# Patient Record
Sex: FEMALE | Race: WHITE
[De-identification: names, ages, dates, MRNs, and addresses within clinical notes are randomized per-mention and may not be internally consistent; named-entity substitution may affect disease eponyms.]

---

## 2018-03-17 ENCOUNTER — HOSPITAL ENCOUNTER (EMERGENCY)
Dept: HOSPITAL 91 - FTE | Age: 48
Discharge: HOME | End: 2018-03-17
Payer: COMMERCIAL

## 2018-03-17 ENCOUNTER — HOSPITAL ENCOUNTER (EMERGENCY)
Age: 48
Discharge: HOME | End: 2018-03-17

## 2018-03-17 DIAGNOSIS — E11.9: ICD-10-CM

## 2018-03-17 DIAGNOSIS — N39.0: Primary | ICD-10-CM

## 2018-03-17 DIAGNOSIS — Z79.84: ICD-10-CM

## 2018-03-17 LAB
ADD MAN DIFF?: NO
ADD UMIC: YES
ALANINE AMINOTRANSFERASE: 61 IU/L (ref 13–69)
ALBUMIN/GLOBULIN RATIO: 1.2
ALBUMIN: 4.7 G/DL (ref 3.3–4.9)
ALKALINE PHOSPHATASE: 110 IU/L (ref 42–121)
AMYLASE: 81 U/L (ref 11–123)
ANION GAP: 17 (ref 8–16)
ASPARTATE AMINO TRANSFERASE: 30 IU/L (ref 15–46)
BASOPHIL #: 0 10^3/UL (ref 0–0.1)
BASOPHILS %: 0.2 % (ref 0–2)
BILIRUBIN,DIRECT: 0 MG/DL (ref 0–0.2)
BILIRUBIN,TOTAL: 0.6 MG/DL (ref 0.2–1.3)
BLOOD UREA NITROGEN: 7 MG/DL (ref 7–20)
CALCIUM: 9.5 MG/DL (ref 8.4–10.2)
CARBON DIOXIDE: 27 MMOL/L (ref 21–31)
CHLORIDE: 101 MMOL/L (ref 97–110)
CREATININE: 0.49 MG/DL (ref 0.44–1)
EOSINOPHILS #: 0.1 10^3/UL (ref 0–0.5)
EOSINOPHILS %: 0.4 % (ref 0–7)
GLOBULIN: 3.9 G/DL (ref 1.3–3.2)
GLUCOSE: 216 MG/DL (ref 70–220)
HEMATOCRIT: 40.7 % (ref 37–47)
HEMOGLOBIN: 14.1 G/DL (ref 12–16)
INR: 0.91
LIPASE: 42 U/L (ref 23–300)
LYMPHOCYTES #: 1.3 10^3/UL (ref 0.8–2.9)
LYMPHOCYTES %: 10.1 % (ref 15–51)
MEAN CORPUSCULAR HEMOGLOBIN: 33.2 PG (ref 29–33)
MEAN CORPUSCULAR HGB CONC: 34.6 G/DL (ref 32–37)
MEAN CORPUSCULAR VOLUME: 95.8 FL (ref 82–101)
MEAN PLATELET VOLUME: 10.7 FL (ref 7.4–10.4)
MONOCYTE #: 0.2 10^3/UL (ref 0.3–0.9)
MONOCYTES %: 1.7 % (ref 0–11)
NEUTROPHIL #: 11 10^3/UL (ref 1.6–7.5)
NEUTROPHILS %: 87.1 % (ref 39–77)
NUCLEATED RED BLOOD CELLS #: 0 10^3/UL (ref 0–0)
NUCLEATED RED BLOOD CELLS%: 0 /100WBC (ref 0–0)
PARTIAL THROMBOPLASTIN TIME: 31.9 SEC (ref 25–35)
PLATELET COUNT: 277 10^3/UL (ref 140–415)
POTASSIUM: 3.8 MMOL/L (ref 3.5–5.1)
PROTIME: 12.3 SEC (ref 11.9–14.9)
PT RATIO: 1
RED BLOOD COUNT: 4.25 10^6/UL (ref 4.2–5.4)
RED CELL DISTRIBUTION WIDTH: 11.7 % (ref 11.5–14.5)
SODIUM: 141 MMOL/L (ref 135–144)
TOTAL PROTEIN: 8.6 G/DL (ref 6.1–8.1)
TROPONIN-I: < 0.012 NG/ML (ref 0–0.12)
UR ASCORBIC ACID: 40 MG/DL
UR BACTERIA: (no result) /HPF
UR BILIRUBIN (DIP): NEGATIVE MG/DL
UR BLOOD (DIP): NEGATIVE MG/DL
UR CLARITY: (no result)
UR COLOR: YELLOW
UR GLUCOSE (DIP): (no result) MG/DL
UR KETONES (DIP): (no result) MG/DL
UR LEUKOCYTE ESTERASE (DIP): (no result) LEU/UL
UR MUCUS: (no result) /HPF
UR NITRITE (DIP): NEGATIVE MG/DL
UR PH (DIP): 6 (ref 5–9)
UR RBC: 0 /HPF (ref 0–5)
UR SPECIFIC GRAVITY (DIP): 1.02 (ref 1–1.03)
UR SQUAMOUS EPITHELIAL CELL: (no result) /HPF
UR TOTAL PROTEIN (DIP): (no result) MG/DL
UR UROBILINOGEN (DIP): NEGATIVE MG/DL
UR WBC: 3 /HPF (ref 0–5)
WHITE BLOOD COUNT: 12.6 10^3/UL (ref 4.8–10.8)

## 2018-03-17 PROCEDURE — 85025 COMPLETE CBC W/AUTO DIFF WBC: CPT

## 2018-03-17 PROCEDURE — 84484 ASSAY OF TROPONIN QUANT: CPT

## 2018-03-17 PROCEDURE — 82150 ASSAY OF AMYLASE: CPT

## 2018-03-17 PROCEDURE — 80053 COMPREHEN METABOLIC PANEL: CPT

## 2018-03-17 PROCEDURE — 85610 PROTHROMBIN TIME: CPT

## 2018-03-17 PROCEDURE — 81001 URINALYSIS AUTO W/SCOPE: CPT

## 2018-03-17 PROCEDURE — 96374 THER/PROPH/DIAG INJ IV PUSH: CPT

## 2018-03-17 PROCEDURE — 96376 TX/PRO/DX INJ SAME DRUG ADON: CPT

## 2018-03-17 PROCEDURE — 76705 ECHO EXAM OF ABDOMEN: CPT

## 2018-03-17 PROCEDURE — 99285 EMERGENCY DEPT VISIT HI MDM: CPT

## 2018-03-17 PROCEDURE — 93005 ELECTROCARDIOGRAM TRACING: CPT

## 2018-03-17 PROCEDURE — 87086 URINE CULTURE/COLONY COUNT: CPT

## 2018-03-17 PROCEDURE — 85730 THROMBOPLASTIN TIME PARTIAL: CPT

## 2018-03-17 PROCEDURE — 83690 ASSAY OF LIPASE: CPT

## 2018-03-17 PROCEDURE — 36415 COLL VENOUS BLD VENIPUNCTURE: CPT

## 2018-03-17 PROCEDURE — 81025 URINE PREGNANCY TEST: CPT

## 2018-03-17 RX ADMIN — THIAMINE HYDROCHLORIDE 1 MLS/HR: 100 INJECTION, SOLUTION INTRAMUSCULAR; INTRAVENOUS at 15:26

## 2018-03-17 RX ADMIN — HYDROMORPHONE HYDROCHLORIDE 1 MG: 1 INJECTION, SOLUTION INTRAMUSCULAR; INTRAVENOUS; SUBCUTANEOUS at 13:52

## 2018-03-17 RX ADMIN — HYDROMORPHONE HYDROCHLORIDE 1 MG: 1 INJECTION, SOLUTION INTRAMUSCULAR; INTRAVENOUS; SUBCUTANEOUS at 15:26

## 2018-03-17 RX ADMIN — ONDANSETRON 1 MG: 4 TABLET, ORALLY DISINTEGRATING ORAL at 13:51

## 2018-09-02 ENCOUNTER — HOSPITAL ENCOUNTER (EMERGENCY)
Age: 48
Discharge: HOME | End: 2018-09-02

## 2018-09-02 ENCOUNTER — HOSPITAL ENCOUNTER (EMERGENCY)
Dept: HOSPITAL 91 - FTE | Age: 48
Discharge: HOME | End: 2018-09-02
Payer: COMMERCIAL

## 2018-09-02 DIAGNOSIS — Z79.84: ICD-10-CM

## 2018-09-02 DIAGNOSIS — E11.9: ICD-10-CM

## 2018-09-02 DIAGNOSIS — R10.13: Primary | ICD-10-CM

## 2018-09-02 DIAGNOSIS — R30.0: ICD-10-CM

## 2018-09-02 DIAGNOSIS — R11.0: ICD-10-CM

## 2018-09-02 LAB
ADD MAN DIFF?: NO
ADD UMIC: NO
ALANINE AMINOTRANSFERASE: 16 IU/L (ref 13–69)
ALBUMIN/GLOBULIN RATIO: 1.36
ALBUMIN: 4.5 G/DL (ref 3.3–4.9)
ALKALINE PHOSPHATASE: 62 IU/L (ref 42–121)
ANION GAP: 21 (ref 8–16)
ASPARTATE AMINO TRANSFERASE: 27 IU/L (ref 15–46)
BASOPHIL #: 0 10^3/UL (ref 0–0.1)
BASOPHILS %: 0.3 % (ref 0–2)
BILIRUBIN,DIRECT: 0 MG/DL (ref 0–0.2)
BILIRUBIN,TOTAL: 0.7 MG/DL (ref 0.2–1.3)
BLOOD UREA NITROGEN: 7 MG/DL (ref 7–20)
CALCIUM: 9.6 MG/DL (ref 8.4–10.2)
CARBON DIOXIDE: 19 MMOL/L (ref 21–31)
CHLORIDE: 103 MMOL/L (ref 97–110)
CREATININE: 0.48 MG/DL (ref 0.44–1)
EOSINOPHILS #: 0.1 10^3/UL (ref 0–0.5)
EOSINOPHILS %: 0.3 % (ref 0–7)
GLOBULIN: 3.3 G/DL (ref 1.3–3.2)
GLUCOSE: 248 MG/DL (ref 70–220)
HEMATOCRIT: 37.5 % (ref 37–47)
HEMOGLOBIN: 12.9 G/DL (ref 12–16)
IMMATURE GRANS #M: 0.05 10^3/UL (ref 0–0.03)
IMMATURE GRANS % (M): 0.3 % (ref 0–0.43)
LIPASE: 26 U/L (ref 23–300)
LYMPHOCYTES #: 1.2 10^3/UL (ref 0.8–2.9)
LYMPHOCYTES %: 8 % (ref 15–51)
MEAN CORPUSCULAR HEMOGLOBIN: 33.5 PG (ref 29–33)
MEAN CORPUSCULAR HGB CONC: 34.4 G/DL (ref 32–37)
MEAN CORPUSCULAR VOLUME: 97.4 FL (ref 82–101)
MEAN PLATELET VOLUME: 10.4 FL (ref 7.4–10.4)
MONOCYTE #: 0.6 10^3/UL (ref 0.3–0.9)
MONOCYTES %: 4.2 % (ref 0–11)
NEUTROPHIL #: 13.4 10^3/UL (ref 1.6–7.5)
NEUTROPHILS %: 86.9 % (ref 39–77)
NUCLEATED RED BLOOD CELLS #: 0 10^3/UL (ref 0–0)
NUCLEATED RED BLOOD CELLS%: 0 /100WBC (ref 0–0)
PLATELET COUNT: 280 10^3/UL (ref 140–415)
POTASSIUM: 4.2 MMOL/L (ref 3.5–5.1)
RED BLOOD COUNT: 3.85 10^6/UL (ref 4.2–5.4)
RED CELL DISTRIBUTION WIDTH: 11.9 % (ref 11.5–14.5)
SODIUM: 139 MMOL/L (ref 135–144)
TOTAL PROTEIN: 7.8 G/DL (ref 6.1–8.1)
TROPONIN-I: < 0.012 NG/ML (ref 0–0.12)
UR ASCORBIC ACID: 20 MG/DL
UR BILIRUBIN (DIP): NEGATIVE MG/DL
UR BLOOD (DIP): NEGATIVE MG/DL
UR CLARITY: (no result)
UR COLOR: YELLOW
UR GLUCOSE (DIP): (no result) MG/DL
UR KETONES (DIP): (no result) MG/DL
UR LEUKOCYTE ESTERASE (DIP): NEGATIVE LEU/UL
UR MUCUS: (no result) /HPF
UR NITRITE (DIP): NEGATIVE MG/DL
UR PH (DIP): 5 (ref 5–9)
UR RBC: 1 /HPF (ref 0–5)
UR SPECIFIC GRAVITY (DIP): 1.03 (ref 1–1.03)
UR SQUAMOUS EPITHELIAL CELL: (no result) /HPF
UR TOTAL PROTEIN (DIP): NEGATIVE MG/DL
UR UROBILINOGEN (DIP): NEGATIVE MG/DL
UR WBC: 2 /HPF (ref 0–5)
WHITE BLOOD COUNT: 15.4 10^3/UL (ref 4.8–10.8)

## 2018-09-02 PROCEDURE — 87086 URINE CULTURE/COLONY COUNT: CPT

## 2018-09-02 PROCEDURE — 85025 COMPLETE CBC W/AUTO DIFF WBC: CPT

## 2018-09-02 PROCEDURE — 82962 GLUCOSE BLOOD TEST: CPT

## 2018-09-02 PROCEDURE — 81003 URINALYSIS AUTO W/O SCOPE: CPT

## 2018-09-02 PROCEDURE — 99284 EMERGENCY DEPT VISIT MOD MDM: CPT

## 2018-09-02 PROCEDURE — 80053 COMPREHEN METABOLIC PANEL: CPT

## 2018-09-02 PROCEDURE — 84484 ASSAY OF TROPONIN QUANT: CPT

## 2018-09-02 PROCEDURE — 83690 ASSAY OF LIPASE: CPT

## 2018-09-02 PROCEDURE — 81001 URINALYSIS AUTO W/SCOPE: CPT

## 2018-09-02 RX ADMIN — THIAMINE HYDROCHLORIDE 1 MLS/HR: 100 INJECTION, SOLUTION INTRAMUSCULAR; INTRAVENOUS at 10:48

## 2018-09-02 RX ADMIN — FAMOTIDINE 1 MG: 20 TABLET ORAL at 10:29

## 2018-09-02 RX ADMIN — ALUMINUM HYDROXIDE, MAGNESIUM HYDROXIDE, DIMETHICONE 1 ML: 200; 200; 20 SUSPENSION ORAL at 10:29

## 2018-09-02 RX ADMIN — ONDANSETRON 1 MG: 4 TABLET, ORALLY DISINTEGRATING ORAL at 10:29

## 2019-06-19 ENCOUNTER — HOSPITAL ENCOUNTER (INPATIENT)
Dept: HOSPITAL 91 - MS1 | Age: 49
LOS: 2 days | Discharge: HOME | DRG: 342 | End: 2019-06-21
Payer: COMMERCIAL

## 2019-06-19 ENCOUNTER — HOSPITAL ENCOUNTER (INPATIENT)
Dept: HOSPITAL 10 - E/R | Age: 49
LOS: 2 days | Discharge: HOME | DRG: 342 | End: 2019-06-21
Attending: INTERNAL MEDICINE
Payer: COMMERCIAL

## 2019-06-19 VITALS
WEIGHT: 132.94 LBS | WEIGHT: 132.94 LBS | BODY MASS INDEX: 23.55 KG/M2 | BODY MASS INDEX: 23.55 KG/M2 | HEIGHT: 63 IN | HEIGHT: 63 IN

## 2019-06-19 DIAGNOSIS — Z98.51: ICD-10-CM

## 2019-06-19 DIAGNOSIS — K35.80: Primary | ICD-10-CM

## 2019-06-19 DIAGNOSIS — E87.2: ICD-10-CM

## 2019-06-19 DIAGNOSIS — H40.9: ICD-10-CM

## 2019-06-19 DIAGNOSIS — E11.9: ICD-10-CM

## 2019-06-19 DIAGNOSIS — Z79.4: ICD-10-CM

## 2019-06-19 LAB
ADD MAN DIFF?: NO
ALANINE AMINOTRANSFERASE: 75 IU/L (ref 13–69)
ALBUMIN/GLOBULIN RATIO: 1.25
ALBUMIN: 5 G/DL (ref 3.3–4.9)
ALKALINE PHOSPHATASE: 92 IU/L (ref 42–121)
ANION GAP: 13 (ref 5–13)
ASPARTATE AMINO TRANSFERASE: 61 IU/L (ref 15–46)
BASOPHIL #: 0.1 10^3/UL (ref 0–0.1)
BASOPHILS %: 0.3 % (ref 0–2)
BILIRUBIN,DIRECT: 0 MG/DL (ref 0–0.2)
BILIRUBIN,TOTAL: 0.7 MG/DL (ref 0.2–1.3)
BLOOD UREA NITROGEN: 8 MG/DL (ref 7–20)
CALCIUM: 9.8 MG/DL (ref 8.4–10.2)
CARBON DIOXIDE: 26 MMOL/L (ref 21–31)
CHLORIDE: 98 MMOL/L (ref 97–110)
CREATININE: 0.46 MG/DL (ref 0.44–1)
EOSINOPHILS #: 0 10^3/UL (ref 0–0.5)
EOSINOPHILS %: 0.1 % (ref 0–7)
GLOBULIN: 4 G/DL (ref 1.3–3.2)
GLUCOSE: 228 MG/DL (ref 70–220)
HEMATOCRIT: 41.5 % (ref 37–47)
HEMOGLOBIN: 13.8 G/DL (ref 12–16)
IMMATURE GRANS #M: 0.06 10^3/UL (ref 0–0.03)
IMMATURE GRANS % (M): 0.4 % (ref 0–0.43)
LIPASE: 45 U/L (ref 23–300)
LYMPHOCYTES #: 1 10^3/UL (ref 0.8–2.9)
LYMPHOCYTES %: 6.4 % (ref 15–51)
MEAN CORPUSCULAR HEMOGLOBIN: 33 PG (ref 29–33)
MEAN CORPUSCULAR HGB CONC: 33.3 G/DL (ref 32–37)
MEAN CORPUSCULAR VOLUME: 99.3 FL (ref 82–101)
MEAN PLATELET VOLUME: 10 FL (ref 7.4–10.4)
MONOCYTE #: 0.2 10^3/UL (ref 0.3–0.9)
MONOCYTES %: 1.6 % (ref 0–11)
NEUTROPHIL #: 13.7 10^3/UL (ref 1.6–7.5)
NEUTROPHILS %: 91.2 % (ref 39–77)
NUCLEATED RED BLOOD CELLS #: 0 10^3/UL (ref 0–0)
NUCLEATED RED BLOOD CELLS%: 0 /100WBC (ref 0–0)
PLATELET COUNT: 254 10^3/UL (ref 140–415)
POTASSIUM: 3.8 MMOL/L (ref 3.5–5.1)
RED BLOOD COUNT: 4.18 10^6/UL (ref 4.2–5.4)
RED CELL DISTRIBUTION WIDTH: 11.9 % (ref 11.5–14.5)
SODIUM: 137 MMOL/L (ref 135–144)
TOTAL PROTEIN: 9 G/DL (ref 6.1–8.1)
URINE KETONES (DIP) POC: (no result)
URINE PH (DIP) POC: 7 (ref 5–8.5)
URINE TOTAL PROTEIN POC: (no result)
WHITE BLOOD COUNT: 15 10^3/UL (ref 4.8–10.8)

## 2019-06-19 PROCEDURE — 83735 ASSAY OF MAGNESIUM: CPT

## 2019-06-19 PROCEDURE — 82962 GLUCOSE BLOOD TEST: CPT

## 2019-06-19 PROCEDURE — 96375 TX/PRO/DX INJ NEW DRUG ADDON: CPT

## 2019-06-19 PROCEDURE — 80053 COMPREHEN METABOLIC PANEL: CPT

## 2019-06-19 PROCEDURE — 81025 URINE PREGNANCY TEST: CPT

## 2019-06-19 PROCEDURE — 80061 LIPID PANEL: CPT

## 2019-06-19 PROCEDURE — 99285 EMERGENCY DEPT VISIT HI MDM: CPT

## 2019-06-19 PROCEDURE — 85610 PROTHROMBIN TIME: CPT

## 2019-06-19 PROCEDURE — 93005 ELECTROCARDIOGRAM TRACING: CPT

## 2019-06-19 PROCEDURE — 88304 TISSUE EXAM BY PATHOLOGIST: CPT

## 2019-06-19 PROCEDURE — 74176 CT ABD & PELVIS W/O CONTRAST: CPT

## 2019-06-19 PROCEDURE — 71045 X-RAY EXAM CHEST 1 VIEW: CPT

## 2019-06-19 PROCEDURE — 85730 THROMBOPLASTIN TIME PARTIAL: CPT

## 2019-06-19 PROCEDURE — 85025 COMPLETE CBC W/AUTO DIFF WBC: CPT

## 2019-06-19 PROCEDURE — 84443 ASSAY THYROID STIM HORMONE: CPT

## 2019-06-19 PROCEDURE — 36415 COLL VENOUS BLD VENIPUNCTURE: CPT

## 2019-06-19 PROCEDURE — 96374 THER/PROPH/DIAG INJ IV PUSH: CPT

## 2019-06-19 PROCEDURE — 83690 ASSAY OF LIPASE: CPT

## 2019-06-19 PROCEDURE — 81003 URINALYSIS AUTO W/O SCOPE: CPT

## 2019-06-19 PROCEDURE — 83036 HEMOGLOBIN GLYCOSYLATED A1C: CPT

## 2019-06-19 RX ADMIN — KETOROLAC TROMETHAMINE 1 MG: 15 INJECTION, SOLUTION INTRAMUSCULAR; INTRAVENOUS at 22:58

## 2019-06-19 RX ADMIN — FAMOTIDINE 1 MG: 10 INJECTION, SOLUTION INTRAVENOUS at 22:39

## 2019-06-19 RX ADMIN — ALUMINUM HYDROXIDE, MAGNESIUM HYDROXIDE, DIMETHICONE 1 ML: 200; 200; 20 SUSPENSION ORAL at 22:39

## 2019-06-19 NOTE — ERD
ER Documentation


Chief Complaint


Chief Complaint





C/O EPIGASTRIC PAIN X5 HRS





HPI


The patient is a 49-year-old female, presenting to the ER because of recurrent 


epigastric abdominal pain about 4:30 PM, had similar symptoms previously, 


complains of constipation, denies fever, chills, neck pain, chest pain, dyspnea,


burping, vomiting, dysuria, complains of constipation.  She does not smoke nor 


drink





She had an ultrasound of the gallbladder September 2, 2018 that was unremarkable





Past medical history: Diabetes mellitus, glaucoma


Past surgical history: Tubal ligation, fibroid





ROS


All systems reviewed and are negative except as per history of present illness.





Medications


Home Meds


Active Scripts


Ondansetron (Ondansetron Odt) 4 Mg Tab.rapdis, 4 MG PO Q6H PRN for NAUSEA AND/OR


VOMITING, #10 TAB


   Prov:SHANIQUE HERNADEZ PA-C         9/2/18


Famotidine* (Pepcid*) 20 Mg Tablet, 20 MG PO QHS for 10 Days, TAB


   Prov:SHANIQUE HERNADEZ PA-C         9/2/18


Omeprazole* (Omeprazole*) 20 Mg Capsule.dr, 20 MG PO DAILY, #14


   Prov:SHANIQUE HERNADEZ PA-C         9/2/18


Metformin Hcl* (Metformin Hcl* ER) 1,000 Mg Tab.er.24, 1000 MG PO DAILY, #30 TAB


   Prov:SHANIQUE HERNADEZ PA-C         9/2/18


Metronidazole* (Metrogel* Vaginal) 0.75% -70 Gram Gel.w.appl, 1 APPFUL VAG HS 


for 7 Days, TUB


   Prov:SHANIQUE HERNADEZ PA-C         9/2/18


Hydrocodone/Acetaminophen (Norco 5-325 Tablet) 1 Each Tablet, 1 TAB PO Q6H PRN 


for PAIN, #20 TAB


   Prov:PASILASUNITA STAFFORDAR F         3/17/18


Acetaminophen* (Tylophen*) 500 Mg Capsule, 1 CAP PO Q4 PRN for PAIN AND OR 


ELEVATED TEMP, #20 CAP


   Prov:PASILABANSUNITAAR F         3/17/18


Cephalexin* (Keflex*) 500 Mg Capsule, 500 MG PO QID for 5 Days, CAP


   Prov:PASILABAN,KLAR F         3/17/18


Famotidine* (Pepcid*) 20 Mg Tablet, 20 MG PO DAILY for 30 Days, TAB


   Prov:CADY ESTRELLA         3/17/18


Ondansetron Hcl* (Zofran*) 4 Mg Tablet, 4 MG PO Q8H PRN for NAUSEA AND/OR 


VOMITING, #30 TAB


   Prov:CADY ESTRELLA         3/17/18


Metformin* (Glucophage*) 500 Mg Tab, 500 MG PO BID, #60 TAB


   Prov:CADY ESTRELLA         3/17/18





Allergies


Allergies:  


Coded Allergies:  


     No Known Allergy (Unverified , 3/17/18)





PMhx/Soc


History of Surgery:  Yes (Fibroids removal,Tubal ligation)


Anesthesia Reaction:  No


Hx Miscellaneous Medical Probl:  Yes (glaucoma,DM2)


Hx Alcohol Use:  No


Hx Substance Use:  No


Hx Tobacco Use:  No





Physical Exam


Vitals





Vital Signs


  Date      Temp  Pulse  Resp  B/P (MAP)   Pulse Ox  O2          O2 Flow    FiO2


Time                                                 Delivery    Rate


   6/19/19  98.4     79    19      152/71       100


     22:17                           (98)





Physical Exam


 Const:      No acute distress.


 Head:        Atraumatic.


 Eyes:       Normal Conjunctiva.


 ENT:         Normal External Ears, Nose and Mouth.


 Neck:        Full range of motion.  No meningismus.


 Resp:         Clear to auscultation bilaterally.


 Cardio:       Regular rate and rhythm.


 Abd:         Soft,  non distended, normal bowel sounds, epigastric and 


suprapubic abdominal tenderness, no right lower quadrant/right upper 


quadrant/rigidity/rebound/CVA tenderness


 Skin:         No petechiae or rashes.


 Back:        No midline or flank tenderness.


 Ext:          No cyanosis, or edema.


 Neur:        Awake and alert. No focal deficit


 Psych:        Normal Mood and Affect.


Result Diagram:  


6/19/19 2236 6/19/19 2236





Results 24 hrs





Laboratory Tests


Test
                               6/19/19
22:31  6/19/19
22:33   6/19/19
22:36


POC Beta HCG, Qualitative           NEGATIVE


Bedside Urine pH (LAB)                                      7.0


Bedside Urine Protein (LAB)                                  2+


Bedside Urine Glucose (UA)                                0.50%


Bedside Urine Ketones (LAB)                                  3+


Bedside Urine Blood                                Negative


Bedside Urine Nitrite (LAB)                        Negative


Bedside Urine Leukocyte
Esterase    
              Negative 
     



(L


White Blood Count                                                  15.0 10^3/ul


Red Blood Count                                                    4.18 10^6/ul


Hemoglobin                                                            13.8 g/dl


Hematocrit                                                               41.5 %


Mean Corpuscular Volume                                                 99.3 fl


Mean Corpuscular Hemoglobin                                             33.0 pg


Mean Corpuscular                    
              
                 33.3 g/dl 



Hemoglobin
Concent


Red Cell Distribution Width                                              11.9 %


Platelet Count                                                      254 10^3/UL


Mean Platelet Volume                                                    10.0 fl


Immature Granulocytes %                                                 0.400 %


Neutrophils %                                                            91.2 %


Lymphocytes %                                                             6.4 %


Monocytes %                                                               1.6 %


Eosinophils %                                                             0.1 %


Basophils %                                                               0.3 %


Nucleated Red Blood Cells %                                         0.0 /100WBC


Immature Granulocytes #                                           0.060 10^3/ul


Neutrophils #                                                      13.7 10^3/ul


Lymphocytes #                                                       1.0 10^3/ul


Monocytes #                                                         0.2 10^3/ul


Eosinophils #                                                       0.0 10^3/ul


Basophils #                                                         0.1 10^3/ul


Nucleated Red Blood Cells #                                         0.0 10^3/ul


Sodium Level                                                         137 mmol/L


Potassium Level                                                      3.8 mmol/L


Chloride Level                                                        98 mmol/L


Carbon Dioxide Level                                                  26 mmol/L


Anion Gap                                                                    13


Blood Urea Nitrogen                                                     8 mg/dl


Creatinine                                                           0.46 mg/dl


Est Glomerular Filtrat Rate
mL/min  
              
              > 60 mL/min 



Glucose Level                                                         228 mg/dl


Calcium Level                                                         9.8 mg/dl


Total Bilirubin                                                       0.7 mg/dl


Direct Bilirubin                                                     0.00 mg/dl


Indirect Bilirubin                                                    0.7 mg/dl


Aspartate Amino Transf
(AST/SGOT)   
              
                   61 IU/L 



Alanine                             
              
                   75 IU/L 



Aminotransferase
(ALT/SGPT)


Alkaline Phosphatase                                                    92 IU/L


Total Protein                                                          9.0 g/dl


Albumin                                                                5.0 g/dl


Globulin                                                              4.00 g/dl


Albumin/Globulin Ratio                                                     1.25


Lipase                                                                   45 U/L





Current Medications


 Medications
   Dose
          Sig/Ron
       Start Time
   Status  Last


 (Trade)       Ordered        Route
 PRN     Stop Time              Admin
Dose


                              Reason                                Admin


                40 ml          ONCE  ONCE
    6/19/19       DC           6/19/19


Miscellaneous                 PO
            23:00
                       22:39




 Medication
                                6/19/19 23:01


 (Gi Cocktail


(2))


 Famotidine
    20 mg          ONCE  ONCE
    6/19/19       DC           6/19/19


(Pepcid Iv)                   IV
            23:00
                       22:39



                                             6/19/19 23:01


 Ketorolac
     30 mg          ONCE  ONCE
    6/19/19       DC           6/19/19


Tromethamine
                 IV
            22:52
                       22:58



 (Toradol)                                   6/19/19 22:53








Procedures/MDM


MEDICAL MAKING DECISION: The patient is a 49-year-old female, presenting with 


acute abdominal pain unclear etiology with leukocytosis.  I advised her of 


options that she can have a CT scan of the abdomen now to rule out any pathology


or return in 8-hour for evaluation, she would like to have abdominal CT scan now





She was treated with GI cocktail, Pepcid IV, Toradol 30 g IV for pain with good 


response.





The differential diagnoses considered include but are not limited to 


cholelithiasis, cholecystitis,  choledocholithiasis, cholangitis, pancreatitis, 


hepatitis, gastritis, peptic ulcer disease, gastric ulcer, appendicitis,  cyst


itis,  diverticulitis, partial small bowel obstruction.





Departure


Diagnosis:  


   Primary Impression:  


   Abdominal pain


Condition:  Stable


Comments


If the abdominal CT scan is negative, she was advised to return in 8-hour for 


reevaluation





The on call physician Dr Elias will review the CT scan and treat her 


accordingly











MILAGRO HINDS MD              Jun 19, 2019 22:21

## 2019-06-20 VITALS — SYSTOLIC BLOOD PRESSURE: 128 MMHG | RESPIRATION RATE: 20 BRPM | DIASTOLIC BLOOD PRESSURE: 74 MMHG | HEART RATE: 68 BPM

## 2019-06-20 VITALS — SYSTOLIC BLOOD PRESSURE: 125 MMHG | DIASTOLIC BLOOD PRESSURE: 76 MMHG | HEART RATE: 68 BPM | RESPIRATION RATE: 14 BRPM

## 2019-06-20 VITALS — HEART RATE: 68 BPM | DIASTOLIC BLOOD PRESSURE: 71 MMHG | RESPIRATION RATE: 15 BRPM | SYSTOLIC BLOOD PRESSURE: 120 MMHG

## 2019-06-20 VITALS — SYSTOLIC BLOOD PRESSURE: 143 MMHG | HEART RATE: 68 BPM | DIASTOLIC BLOOD PRESSURE: 78 MMHG | RESPIRATION RATE: 20 BRPM

## 2019-06-20 VITALS — SYSTOLIC BLOOD PRESSURE: 138 MMHG | RESPIRATION RATE: 17 BRPM | HEART RATE: 68 BPM | DIASTOLIC BLOOD PRESSURE: 75 MMHG

## 2019-06-20 VITALS — HEART RATE: 70 BPM | DIASTOLIC BLOOD PRESSURE: 67 MMHG | SYSTOLIC BLOOD PRESSURE: 126 MMHG

## 2019-06-20 VITALS — DIASTOLIC BLOOD PRESSURE: 69 MMHG | SYSTOLIC BLOOD PRESSURE: 127 MMHG | RESPIRATION RATE: 19 BRPM | HEART RATE: 70 BPM

## 2019-06-20 VITALS — DIASTOLIC BLOOD PRESSURE: 67 MMHG | SYSTOLIC BLOOD PRESSURE: 119 MMHG | HEART RATE: 68 BPM | RESPIRATION RATE: 16 BRPM

## 2019-06-20 VITALS — SYSTOLIC BLOOD PRESSURE: 132 MMHG | DIASTOLIC BLOOD PRESSURE: 70 MMHG | HEART RATE: 65 BPM

## 2019-06-20 VITALS — DIASTOLIC BLOOD PRESSURE: 62 MMHG | SYSTOLIC BLOOD PRESSURE: 98 MMHG | HEART RATE: 71 BPM | RESPIRATION RATE: 18 BRPM

## 2019-06-20 VITALS — HEART RATE: 75 BPM | SYSTOLIC BLOOD PRESSURE: 139 MMHG | DIASTOLIC BLOOD PRESSURE: 71 MMHG | RESPIRATION RATE: 16 BRPM

## 2019-06-20 VITALS — DIASTOLIC BLOOD PRESSURE: 67 MMHG | SYSTOLIC BLOOD PRESSURE: 123 MMHG | HEART RATE: 68 BPM

## 2019-06-20 VITALS — SYSTOLIC BLOOD PRESSURE: 123 MMHG | HEART RATE: 75 BPM | DIASTOLIC BLOOD PRESSURE: 71 MMHG

## 2019-06-20 VITALS — SYSTOLIC BLOOD PRESSURE: 113 MMHG | RESPIRATION RATE: 17 BRPM | DIASTOLIC BLOOD PRESSURE: 55 MMHG | HEART RATE: 96 BPM

## 2019-06-20 VITALS — HEART RATE: 66 BPM | RESPIRATION RATE: 15 BRPM | SYSTOLIC BLOOD PRESSURE: 136 MMHG | DIASTOLIC BLOOD PRESSURE: 74 MMHG

## 2019-06-20 LAB
ADD MAN DIFF?: NO
ALANINE AMINOTRANSFERASE: 61 IU/L (ref 13–69)
ALBUMIN/GLOBULIN RATIO: 1.38
ALBUMIN: 4.7 G/DL (ref 3.3–4.9)
ALKALINE PHOSPHATASE: 84 IU/L (ref 42–121)
ANION GAP: 15 (ref 5–13)
ASPARTATE AMINO TRANSFERASE: 54 IU/L (ref 15–46)
BASOPHIL #: 0.1 10^3/UL (ref 0–0.1)
BASOPHILS %: 0.3 % (ref 0–2)
BILIRUBIN,DIRECT: 0 MG/DL (ref 0–0.2)
BILIRUBIN,TOTAL: 1.1 MG/DL (ref 0.2–1.3)
BLOOD UREA NITROGEN: 6 MG/DL (ref 7–20)
CALCIUM: 10.1 MG/DL (ref 8.4–10.2)
CARBON DIOXIDE: 27 MMOL/L (ref 21–31)
CHLORIDE: 104 MMOL/L (ref 97–110)
CHOL/HDL RATIO: 3.2 RATIO
CHOLESTEROL: 219 MG/DL (ref 100–200)
CREATININE: 0.51 MG/DL (ref 0.44–1)
EOSINOPHILS #: 0 10^3/UL (ref 0–0.5)
EOSINOPHILS %: 0 % (ref 0–7)
GLOBULIN: 3.4 G/DL (ref 1.3–3.2)
GLUCOSE: 168 MG/DL (ref 70–220)
HDL CHOLESTEROL: 68 MG/DL (ref 37–92)
HEMATOCRIT: 41.2 % (ref 37–47)
HEMOGLOBIN A1C: 6.7 % (ref 0–5.9)
HEMOGLOBIN: 13.6 G/DL (ref 12–16)
IMMATURE GRANS #M: 0.13 10^3/UL (ref 0–0.03)
IMMATURE GRANS % (M): 0.7 % (ref 0–0.43)
INR: 0.86
LDL CHOLESTEROL,CALCULATED: 138 MG/DL
LYMPHOCYTES #: 1 10^3/UL (ref 0.8–2.9)
LYMPHOCYTES %: 5.6 % (ref 15–51)
MAGNESIUM: 2 MG/DL (ref 1.7–2.5)
MEAN CORPUSCULAR HEMOGLOBIN: 32.8 PG (ref 29–33)
MEAN CORPUSCULAR HGB CONC: 33 G/DL (ref 32–37)
MEAN CORPUSCULAR VOLUME: 99.3 FL (ref 82–101)
MEAN PLATELET VOLUME: 10.3 FL (ref 7.4–10.4)
MONOCYTE #: 1.2 10^3/UL (ref 0.3–0.9)
MONOCYTES %: 6.5 % (ref 0–11)
NEUTROPHIL #: 16.1 10^3/UL (ref 1.6–7.5)
NEUTROPHILS %: 86.9 % (ref 39–77)
NUCLEATED RED BLOOD CELLS #: 0 10^3/UL (ref 0–0)
NUCLEATED RED BLOOD CELLS%: 0 /100WBC (ref 0–0)
PARTIAL THROMBOPLASTIN TIME: 30.6 SEC (ref 23–35)
PLATELET COUNT: 260 10^3/UL (ref 140–415)
POTASSIUM: 3.9 MMOL/L (ref 3.5–5.1)
PROTIME: 11.8 SEC (ref 11.9–14.9)
PT RATIO: 0.9
RED BLOOD COUNT: 4.15 10^6/UL (ref 4.2–5.4)
RED CELL DISTRIBUTION WIDTH: 11.9 % (ref 11.5–14.5)
SODIUM: 146 MMOL/L (ref 135–144)
THYROID STIMULATING HORMONE: 0.27 MIU/L (ref 0.47–4.68)
TOTAL PROTEIN: 8.1 G/DL (ref 6.1–8.1)
TRIGLYCERIDES: 66 MG/DL (ref 0–149)
WHITE BLOOD COUNT: 18.5 10^3/UL (ref 4.8–10.8)

## 2019-06-20 PROCEDURE — 0DTJ4ZZ RESECTION OF APPENDIX, PERCUTANEOUS ENDOSCOPIC APPROACH: ICD-10-PCS | Performed by: SURGERY

## 2019-06-20 PROCEDURE — 0DTJ4ZZ RESECTION OF APPENDIX, PERCUTANEOUS ENDOSCOPIC APPROACH: ICD-10-PCS

## 2019-06-20 RX ADMIN — ASCORBIC ACID, VITAMIN A PALMITATE, CHOLECALCIFEROL, THIAMINE HYDROCHLORIDE, RIBOFLAVIN-5 PHOSPHATE SODIUM, PYRIDOXINE HYDROCHLORIDE, NIACINAMIDE, DEXPANTHENOL, ALPHA-TOCOPHEROL ACETATE, VITAMIN K1, FOLIC ACID, BIOTIN, CYANOCOBALAMIN 1 MLS/HR: 200; 3300; 200; 6; 3.6; 6; 40; 15; 10; 150; 600; 60; 5 INJECTION, SOLUTION INTRAVENOUS at 05:48

## 2019-06-20 RX ADMIN — PIPERACILLIN SODIUM AND TAZOBACTAM SODIUM 1 MLS/HR: 3; .375 INJECTION, POWDER, LYOPHILIZED, FOR SOLUTION INTRAVENOUS at 12:00

## 2019-06-20 RX ADMIN — DORZOLAMIDE HYDROCHLORIDE AND TIMOLOL MALEATE 1 DROP: 20; 5 SOLUTION/ DROPS OPHTHALMIC at 02:30

## 2019-06-20 RX ADMIN — INSULIN ASPART 1 UNIT: 100 INJECTION, SOLUTION INTRAVENOUS; SUBCUTANEOUS at 21:06

## 2019-06-20 RX ADMIN — INSULIN ASPART 1 UNIT: 100 INJECTION, SOLUTION INTRAVENOUS; SUBCUTANEOUS at 05:00

## 2019-06-20 RX ADMIN — BUPIVACAINE HYDROCHLORIDE AND EPINEPHRINE BITARTRATE 1 ML: 2.5; .005 INJECTION, SOLUTION EPIDURAL; INFILTRATION; INTRACAUDAL; PERINEURAL at 11:00

## 2019-06-20 RX ADMIN — KETOROLAC TROMETHAMINE PRN MG: 15 INJECTION, SOLUTION INTRAMUSCULAR; INTRAVENOUS at 06:06

## 2019-06-20 RX ADMIN — FOLIC ACID SCH MLS/HR: 5 INJECTION, SOLUTION INTRAMUSCULAR; INTRAVENOUS; SUBCUTANEOUS at 18:20

## 2019-06-20 RX ADMIN — LATANOPROST 1 DROP: 50 SOLUTION OPHTHALMIC at 09:00

## 2019-06-20 RX ADMIN — PIPERACILLIN SODIUM AND TAZOBACTAM SODIUM 1 MLS/HR: 3; .375 INJECTION, POWDER, LYOPHILIZED, FOR SOLUTION INTRAVENOUS at 05:54

## 2019-06-20 RX ADMIN — DORZOLAMIDE HYDROCHLORIDE AND TIMOLOL MALEATE 1 DROP: 20; 5 SOLUTION/ DROPS OPHTHALMIC at 09:00

## 2019-06-20 RX ADMIN — FAMOTIDINE 1 MG: 20 TABLET ORAL at 21:02

## 2019-06-20 RX ADMIN — FOLIC ACID SCH MLS/HR: 5 INJECTION, SOLUTION INTRAMUSCULAR; INTRAVENOUS; SUBCUTANEOUS at 05:48

## 2019-06-20 RX ADMIN — PIPERACILLIN SODIUM AND TAZOBACTAM SODIUM SCH MLS/HR: 3; .375 INJECTION, POWDER, LYOPHILIZED, FOR SOLUTION INTRAVENOUS at 17:44

## 2019-06-20 RX ADMIN — PANTOPRAZOLE SODIUM SCH MG: 40 TABLET, DELAYED RELEASE ORAL at 06:00

## 2019-06-20 RX ADMIN — INSULIN ASPART 1 UNIT: 100 INJECTION, SOLUTION INTRAVENOUS; SUBCUTANEOUS at 17:53

## 2019-06-20 RX ADMIN — PIPERACILLIN SODIUM AND TAZOBACTAM SODIUM SCH MLS/HR: 3; .375 INJECTION, POWDER, LYOPHILIZED, FOR SOLUTION INTRAVENOUS at 05:54

## 2019-06-20 RX ADMIN — INSULIN ASPART 1 UNIT: 100 INJECTION, SOLUTION INTRAVENOUS; SUBCUTANEOUS at 13:20

## 2019-06-20 RX ADMIN — KETOROLAC TROMETHAMINE 1 MG: 15 INJECTION, SOLUTION INTRAMUSCULAR; INTRAVENOUS at 06:06

## 2019-06-20 RX ADMIN — PIPERACILLIN SODIUM AND TAZOBACTAM SODIUM 1 MLS/HR: 3; .375 INJECTION, POWDER, LYOPHILIZED, FOR SOLUTION INTRAVENOUS at 17:44

## 2019-06-20 RX ADMIN — LATANOPROST SCH DROP: 50 SOLUTION OPHTHALMIC at 09:00

## 2019-06-20 RX ADMIN — PIPERACILLIN SODIUM AND TAZOBACTAM SODIUM 1 MLS/HR: 3; .375 INJECTION, POWDER, LYOPHILIZED, FOR SOLUTION INTRAVENOUS at 01:47

## 2019-06-20 RX ADMIN — INSULIN ASPART 1 UNIT: 100 INJECTION, SOLUTION INTRAVENOUS; SUBCUTANEOUS at 09:00

## 2019-06-20 RX ADMIN — PANTOPRAZOLE SODIUM 1 MG: 40 TABLET, DELAYED RELEASE ORAL at 06:00

## 2019-06-20 RX ADMIN — PIPERACILLIN SODIUM AND TAZOBACTAM SODIUM SCH MLS/HR: 3; .375 INJECTION, POWDER, LYOPHILIZED, FOR SOLUTION INTRAVENOUS at 12:00

## 2019-06-20 RX ADMIN — ASCORBIC ACID, VITAMIN A PALMITATE, CHOLECALCIFEROL, THIAMINE HYDROCHLORIDE, RIBOFLAVIN-5 PHOSPHATE SODIUM, PYRIDOXINE HYDROCHLORIDE, NIACINAMIDE, DEXPANTHENOL, ALPHA-TOCOPHEROL ACETATE, VITAMIN K1, FOLIC ACID, BIOTIN, CYANOCOBALAMIN 1 MLS/HR: 200; 3300; 200; 6; 3.6; 6; 40; 15; 10; 150; 600; 60; 5 INJECTION, SOLUTION INTRAVENOUS at 18:20

## 2019-06-20 RX ADMIN — DORZOLAMIDE HYDROCHLORIDE AND TIMOLOL MALEATE 1 DROP: 20; 5 SOLUTION/ DROPS OPHTHALMIC at 21:00

## 2019-06-20 NOTE — PREAC
Date/Time of Note


Date/Time of Note


DATE: 6/20/19 


TIME: 10:08





Anesthesia Eval and Record


Evaluation


Time Pre-Procedure Interview


DATE: 6/20/19 


TIME: 10:08


Age


49


Sex


female


NPO:  8 hrs


Preoperative diagnosis


appendicitis


Planned procedure


lap appy





Past Medical History


Past Medical History:  Includes


Endo:  Diabetes





Surgery & Anesthesia Issues


No known issue





Meds


Anticoagulation:  No


Beta Blocker within 24 hr:  No


Reason Beta Blocker not given:  Pt. not on B-Blocker


Active Scripts


Famotidine* (Pepcid*) 20 Mg Tablet, 20 MG PO QHS for 10 Days, TAB


   Prov:SHANIQUE HERNADEZ PA-C         9/2/18


Omeprazole* (Omeprazole*) 20 Mg Capsule.dr, 20 MG PO DAILY, #14


   Prov:SHANIQUE HERNADEZ PA-C         9/2/18


Metformin Hcl* (Metformin Hcl* ER) 1,000 Mg Tab.er.24, 1000 MG PO DAILY, #30 TAB


   Prov:SHANIQUE HERNADEZ PA-C         9/2/18


Metronidazole* (Metrogel* Vaginal) 0.75% -70 Gram Gel.w.appl, 1 APPFUL VAG HS 


for 7 Days, TUB


   Prov:SHANIQUE HERNADEZ PA-C         9/2/18


Acetaminophen* (Tylophen*) 500 Mg Capsule, 1 CAP PO Q4 PRN for PAIN AND OR 


ELEVATED TEMP, #20 CAP


   Prov:CADY ESTRELLA         3/17/18


Ondansetron Hcl* (Zofran*) 4 Mg Tablet, 4 MG PO Q8H PRN for NAUSEA AND/OR 


VOMITING, #30 TAB


   Prov:CADY ESTRLELA         3/17/18


Reported Medications


Dorzolamide/Timolol* (Dorzolamide/Timolol*) 10 Ml Drops, 1 DROP BOTH EYES BID, 


#1 EA


   6/20/19


[Rhopressa] 0.02% DROPS No Conflict Check, 1 DROP OPHTHALMIC DAILY


   APPLY 1 DROP INTO BOTH EYES AT BEDTIME


   6/20/19


Latanoprost (Latanoprost) 2.5 Ml Drops, 1 DROP OPHTHALMIC DAILY for 30 Days


   6/20/19


Discontinued Reported Medications


Dorzolamide/Timolol* (Dorzolamide/Timolol*) 10 Ml Drops


   6/20/19


Discontinued Scripts


Ondansetron (Ondansetron Odt) 4 Mg Tab.rapdis, 4 MG PO Q6H PRN for NAUSEA AND/OR


 VOMITING, #10 TAB


   Prov:SHANIQUE HERNADEZ PA-C         9/2/18


Hydrocodone/Acetaminophen (Norco 5-325 Tablet) 1 Each Tablet, 1 TAB PO Q6H PRN 


for PAIN, #20 TAB


   Prov:PASILACADY STAFFORD F         3/17/18


Cephalexin* (Keflex*) 500 Mg Capsule, 500 MG PO QID for 5 Days, CAP


   Prov:PASILABAN,SUNITAAR F         3/17/18


Famotidine* (Pepcid*) 20 Mg Tablet, 20 MG PO DAILY for 30 Days, TAB


   Prov:PASILABANCADY F         3/17/18


Metformin* (Glucophage*) 500 Mg Tab, 500 MG PO BID, #60 TAB


   Prov:PASILABANSUNITAAR F         3/17/18





Current Medications


Ondansetron HCl (Zofran Inj) 4 mg BRIDGE ORDER PRN IV NAUSEA/VOMITING;  Start 


6/20/19 at 02:00;  Stop 6/21/19 at 01:59


Acetaminophen (Tylenol Tab) 650 mg ER BRIDGE PRN PO .MILD PAIN 1-3 OR TEMP;  


Start 6/20/19 at 02:00;  Stop 6/21/19 at 01:59


IV Flush (NS 3 ml) 3 ml PER PROTOCOL IV ;  Start 6/20/19 at 02:30


Ondansetron HCl (Zofran Inj) 4 mg Q6H  PRN IV NAUSEA/VOMITING;  Start 6/20/19 at


 02:30


Acetaminophen (Tylenol Tab) 650 mg Q6H  PRN PO .PAIN 1-3 OR TEMP;  Start 6/20/19


 at 02:30


Morphine Sulfate (morphine) 2 mg Q4H  PRN IV .SEVERE PAIN 7-10;  Start 6/20/19 


at 02:30


Docusate Sodium (Colace) 100 mg Q12H  PRN PO .CONSTIPATION;  Start 6/20/19 at 


02:30


Bisacodyl (Dulcolax) 5 mg DAILY  PRN PO .CONSTIPATION;  Start 6/20/19 at 02:30


Dorzolamide/ Timolol (Cosopt) 1 drop BID BOTH EYES ;  Start 6/20/19 at 02:30


Famotidine (Pepcid) 20 mg QHS PO ;  Start 6/20/19 at 21:00


Latanoprost (Xalatan) 1 drop DAILY BOTH EYES ;  Start 6/20/19 at 09:00


Miscellaneous Information 1 drop DAILY OPHTHALMIC ;  Start 6/20/19 at 09:00;  


Status UNV


Pantoprazole (Protonix Tab) 40 mg DAILY@06 PO ;  Start 6/20/19 at 06:00


Insulin Aspart (Novolog Insulin Pen) NOVOLOG *MILD* ALGORI... Q4 SC ;  Start 


6/20/19 at 05:00


Ketorolac Tromethamine (Toradol) 30 mg Q6H  PRN IV PAIN LEVEL 1-3 Last 


administered on 6/20/19at 06:06; Admin Dose 30 MG;  Start 6/20/19 at 04:30;  


Stop 6/22/19 at 04:29


Piperacillin Sod/ Tazobactam Sod 100 ml @  200 mls/hr Q6 IVPB  Last administered


 on 6/20/19at 05:54; Admin Dose 200 MLS/HR;  Start 6/20/19 at 06:00


Dextrose/Sodium Chloride 1,000 ml @  75 mls/hr [id number] IV  Last administered on 


6/20/19at 05:48; Admin Dose 75 MLS/HR;  Start 6/20/19 at 05:00


Meds reviewed:  Yes





Allergies


Coded Allergies:  


     No Known Allergy (Unverified , 6/20/19)


Allergies Reviewed:  Yes





Labs/Studies


Labs Reviewed:  Reviewed by anesthesiologist


Result Diagram:  


6/20/19 0300                                                                    


            6/20/19 0300





Laboratory Tests


6/20/19 03:00








Pregnancy test:  Negative


Studies:  ECG (sr), CXR (nl)





Pre-procedure Exam


Last vitals





Vital Signs


  Date      Temp  Pulse  Resp  B/P (MAP)   Pulse Ox  O2          O2 Flow    FiO2


Time                                                 Delivery    Rate


   6/20/19  98.2     71    18  98/62 (74)        99  Room Air


     08:40





Airway:  Adequate mouth opening


Mallampati:  Mallampati I


Teeth:  Normal


Lung:  Normal


Heart:  Normal





ASA Physical Status


ASA physical status:  2


Emergency:  None





Planned Anesthetic


General/MAC:  ETT


Nerve block:  TAP (bilateral)





Planned Pain Management


Sub-arachniod narcotics, Single shot nerve block, Parenteral pain med





Pre-operative Attestations


Prior to commencing anesthesia and surgery, the patient was re-evaluated, there 


was verification of:


*The patient's identity


*The results of appropriate recent lab work and preoperative vital signs


*The above evaluation not changing prior to induction


*Anesthetic plan, risk benefits, alternative and complications discussed with 


patient/family; questions answered; patient/family understands, accepts and 


wishes to proceed.











JONATHAN BENNETT MD            Jun 20, 2019 10:09

## 2019-06-20 NOTE — EN
Date/Time of Note


Date/Time of Note


DATE: 6/20/19 


TIME: 02:02





ER Progress Note


CT was called in by radiologist as acute appendicitis.  Dr. Panda who is on-call 


was made aware.  Dr. Crandall admit the patient.  Started on Zosyn.











CATALINA STEINBERG             Jun 20, 2019 02:02

## 2019-06-20 NOTE — HP
Date/Time of Note


Date/Time of Note


DATE: 6/20/19 


TIME: 01:46





Assessment/Plan


VTE Prophylaxis


SCD applied (from Nsg):  Yes


Pharmacological prophylaxis:  NA/contraindicated


Pharm contraindication:  low risk/ambulating





Lines/Catheters


IV Catheter Type (from Nrsg):  Saline Lock





Assessment/Plan


Hospital Course


This is a 49-year-old female being admitted to the [facility] floor for:





#1 acute appendicitis: CT studies confirming acute appendicitis.  She does 


report that Toradol helps with her pains we will continue on PRN Toradol, 


possible PRN morphine.  Zosyn IV every 6 hours.  We will keep the patient n.p.o.


except meds.  IV fluid hydration with normal saline.  Will obtain a preop EKG 


and chest x-ray.  General surgery Dr. Adames has already been consulted by the 


ED.





#2 metabolic acidosis: Likely secondary to vomiting and underlying apparent 


acute appendicitis.  We will hydrate the patient with normal saline.  


Antibiotics.  Will follow





#3 diabetes mellitus: We will hold metformin, will check hemoglobin A 1C, 


insulin sliding scale,  will check lipid panel TSH





#4 transaminitis: Likely reactive, will follow if they  still appear to arise 


further will check right upper quadrant ultrasound.





#5  Glaucoma: Continue home eyedrops





#6 DVT GI prophylaxis: SCDs, no GI prophylaxis indicated





Further treatment strategy will be implemented as per the clinical course.


Result Diagram:  


6/19/19 2236 6/19/19 2236





Results 24hrs





Laboratory Tests


Test
                                6/19/19
22:31  6/19/19
22:33  6/19/19
22:36


POC Beta HCG, Qualitative            NEGATIVE


Bedside Urine pH (LAB)                                      7.0


Bedside Urine Protein (LAB)                                 2+  H


Bedside Urine Glucose (UA)                               0.50%  H


Bedside Urine Ketones (LAB)                                 3+  H


Bedside Urine Blood                                 Negative


Bedside Urine Nitrite (LAB)                         Negative


Bedside Urine Leukocyte
Esterase (L  
              Negative  
    



White Blood Count                                                        15.0  H


Red Blood Count                                                          4.18  L


Hemoglobin                                                                13.8


Hematocrit                                                                41.5


Mean Corpuscular Volume                                                   99.3


Mean Corpuscular Hemoglobin                                               33.0


Mean Corpuscular Hemoglobin
Concent  
              
                    33.3  



Red Cell Distribution Width                                               11.9


Platelet Count                                                             254


Mean Platelet Volume                                                      10.0


Immature Granulocytes %                                                  0.400


Neutrophils %                                                            91.2  H


Lymphocytes %                                                             6.4  L


Monocytes %                                                                1.6


Eosinophils %                                                              0.1


Basophils %                                                                0.3


Nucleated Red Blood Cells %                                                0.0


Immature Granulocytes #                                                 0.060  H


Neutrophils #                                                            13.7  H


Lymphocytes #                                                              1.0


Monocytes #                                                               0.2  L


Eosinophils #                                                              0.0


Basophils #                                                                0.1


Nucleated Red Blood Cells #                                                0.0


Sodium Level                                                               137


Potassium Level                                                            3.8


Chloride Level                                                              98


Carbon Dioxide Level                                                        26


Anion Gap                                                                   13


Blood Urea Nitrogen                                                          8


Creatinine                                                                0.46


Est Glomerular Filtrat Rate
mL/min   
              
              > 60  



Glucose Level                                                             228  H


Calcium Level                                                              9.8


Total Bilirubin                                                            0.7


Direct Bilirubin                                                          0.00


Indirect Bilirubin                                                         0.7


Aspartate Amino Transf
(AST/SGOT)    
              
                     61  H



Alanine Aminotransferase
(ALT/SGPT)  
              
                     75  H



Alkaline Phosphatase                                                        92


Total Protein                                                             9.0  H


Albumin                                                                   5.0  H


Globulin                                                                 4.00  H


Albumin/Globulin Ratio                                                    1.25


Lipase                                                                      45








HPI/ROS


Admit Date/Time


Admit Date/Time








Hx of Present Illness


Chief complaint: Abdominal pain





This is a 49-year-old female who presented to the emergency department with 


complaints of abdominal pain.  Patient reports that she woke up yesterday 


morning with abdominal pain but still decided to go to work.  While she was at 


work she states that the pain got worse.  The pain was more in the lower abdomen


in the middle and continue to get worse.  She did report nausea and vomiting.  


She denies any chest pain or shortness of breath or diarrhea.  Denies any rectal


bleeding or any hematemesis.  She states that he has had similar symptoms last 


year in the past but it went away on its own.





Allergies: NKDA





Medications: See MAR





ROS


Const: As per HPI


Eyes : No pain discharge or redness or change in visual acuity


ENT: No pain, sore throat, congestion, congestion,  dysphagia or discharge


Respiratory: No shortness of breath, cough, sputum, wheezing, or pleuritic pain


Cardiovascular: No chest pain, palpitation, PND, or edema


GI : As per HPI


Genitourinary: No dysuria, hematuria, flank pain ,  discharge or CVA tenderness


Musculoskeletal: No joint pain, back pain, neck pain, restricted range of motion


in neck or joints


Skin: No rash, bruising or hives 


Neuro: No headache, dizziness, syncope, seizure, focal weakness


Endocrine: No polyuria, polydipsia, temperature intolerance


Psych: No hallucination, depression, anxiety or suicidal ideation





PMH/Family/Social


Past Medical History


Diabetes mellitus, glaucoma


Medications





Current Medications


Piperacillin Sod/ Tazobactam Sod 100 ml @  200 mls/hr ONCE  ONCE IVPB ;  Start 


6/20/19 at 01:30;  Stop 6/20/19 at 01:59


Coded Allergies:  


     No Known Allergy (Unverified , 6/20/19)





Past Surgical History


Tubal ligation, fibroid





Family History


Significant Family History:  no pertinent family hx





Social History


Alcohol Use:  none


Smoking Status:  Never smoker


Drug Use:  none





Exam/Review of Systems


Vital Signs


Vitals





Vital Signs


  Date      Temp  Pulse  Resp  B/P (MAP)   Pulse Ox  O2          O2 Flow    FiO2


Time                                                 Delivery    Rate


   6/20/19           89    14      127/84       100  Room Air


     00:00                           (98)


   6/19/19  98.4


     22:17








Exam


Exam





General: Patient is a pleasant female currently sitting upright in bed in no 


acute distress.


HEENT: Atraumatic, normocephalic. The pupils are equal, round and reactive. 


Extraocular motor are intact


Neck: Supple with full range of motion. No rigidity or meningismus


Chest: Nontender


Lungs: Clear to auscultation bilaterally no crackles rales or wheezing


Heart: Normal S1-S2, Regular rhythm and rate. No murmur, S3, or S4


Abdomen: Soft , nontender currently on my examination and she did receive pain 


meds,  nondistended , bowel sounds are present. No guarding no rebound 


tenderness , No masses or organomegaly. No costovertebral temporal angle mass


Extremities: Normal to inspection, no edema no cyanosis


Neurologic: Normal mental status, speech normal, cranial nerves II through XII 


are intact, motor and sensory are intact, no focal weakness


Additional Comments


PROCEDURE:   CT Abdomen and Pelvis without contrast. 


 


CLINICAL INDICATION:  Abdominal pain    


 


TECHNIQUE:   CT scan of the abdomen and pelvis without contrast was performed on


a multi-detector high-resolution CT scanner. Coronal and sagittal reformatted 


images obtained from the axial source images. Images were reviewed on a high-


resolution PACS workstation. 


 


Exam CTDI 6.65 mGy


Exam DLP  355.16 mGy-cm


 


DICOM images are available.


One or more of the following dose reduction techniques were utilized:


1.) Automated exposure control


2.) Adjustment of the mA +/- kV according to patient's size


3.) Use of iterative reconstruction technique.


 


COMPARISON:   None 


 


FINDINGS:


 


CT abdomen:


 


LOWER THORAX: Lung bases are clear.


 


LIVER AND GALLBLADDER: No abnormal findings.


 


SPLEEN: Normal.


 


PANCREAS: Normal.


 


ADRENAL GLANDS: Normal.


 


KIDNEYS: Kidneys are symmetric in size and morphology. No hydronephrosis or 


renal calculus. Bilateral ureters are unremarkable.


 


VASCULATURE: Abdominal aorta is normal in caliber.


 


LYMPH NODES: No significant retroperitoneal or mesenteric lymphadenopathy.


 


BOWEL AND MESENTERY: There is a small hiatal hernia. The stomach and small bowel


are otherwise unremarkable. The appendix is filled with fluid and dilated to a 


diameter of 11 mm. No extraluminal gas or evidence of perforation. Distal large 


bowel is unremarkable.


 


 


CT pelvis:


 


The urinary bladder is unremarkable. Uterus and ovaries appear normal for age. 


There is no free fluid in the pelvis. No significant pelvic lymphadenopathy.


 


Bones: Regional bones and superficial soft tissues are grossly unremarkable.


 


 


IMPRESSION:


 


1. Above findings are consistent with acute appendicitis. No evidence of 


perforation.


2. Small hiatal hernia.


  


 


Results called to Dr. Bhatt at 1:22 am on 6/20/2019.


 


RPTAT: HJBB


_____________________________________________ 


Physician Trina           Date    Time 


Electronically viewed and signed by Physician Trina on 06/20/2019 


01:24 


 


D:  06/20/2019 01:24  T:  06/20/2019 01:24


xB/





CC: MILAGRO HINDS MD





[phone]











MANASA NICHOLE                 Jun 20, 2019 01:53

## 2019-06-20 NOTE — PN
Date/Time of Note


Date/Time of Note


DATE: 6/20/19 


TIME: 15:04





Assessment/Plan


VTE Prophylaxis


Risk score (from Ns)>0 risk:  2


SCD applied (from [facility]):  Yes


Pharmacological prophylaxis:  NA/contraindicated


Pharm contraindication:  surgical contra





Lines/Catheters


IV Catheter Type (from [facility]):  Peripheral IV





Assessment/Plan


Hospital Course


1.  Acute appendicitis


Status post lap scopic appendectomy postop day 0


Pain control


Zosyn IV





2.  Diabetes


Sliding scale





3.  Transaminitis likely reactive


Monitor





4. Glaucoma: Continue home eyedrops





Prophylaxis: SCDs


Result Diagram:  


6/20/19 0300                                                                    


           6/20/19 0300





Results 24hrs





Laboratory Tests


Test
                 6/19/19
22:31  6/19/19
22:33  6/19/19
22:36  6/20/19
03:00


POC Beta HCG,         NEGATIVE


Qualitative


Bedside Urine pH                             7.0


(LAB)


Bedside Urine                                2+  H


Protein (LAB)


Bedside Urine                             0.50%  H


Glucose (UA)


Bedside Urine                                3+  H


Ketones (LAB)


Bedside Urine Blood                  Negative


Bedside Urine                        Negative


Nitrite (LAB)


Bedside Urine         
              Negative  
    
              



Leukocyte
Esterase


(L


White Blood Count                                         15.0  H       18.5  #H


Red Blood Count                                           4.18  L        4.15  L


Hemoglobin                                                 13.8           13.6


Hematocrit                                                 41.5           41.2


Mean Corpuscular                                           99.3           99.3


Volume


Mean Corpuscular                                           33.0           32.8


Hemoglobin


Mean Corpuscular      
              
                    33.3  
        33.0  



Hemoglobin
Concent


Red Cell                                                   11.9           11.9


Distribution Width


Platelet Count                                              254            260


Mean Platelet Volume                                       10.0           10.3


Immature                                                  0.400         0.700  H


Granulocytes %


Neutrophils %                                             91.2  H        86.9  H


Lymphocytes %                                              6.4  L         5.6  L


Monocytes %                                                 1.6            6.5


Eosinophils %                                               0.1            0.0


Basophils %                                                 0.3            0.3


Nucleated Red Blood                                         0.0            0.0


Cells %


Immature                                                 0.060  H       0.130  H


Granulocytes #


Neutrophils #                                             13.7  H        16.1  H


Lymphocytes #                                               1.0            1.0


Monocytes #                                                0.2  L         1.2  H


Eosinophils #                                               0.0            0.0


Basophils #                                                 0.1            0.1


Nucleated Red Blood                                         0.0            0.0


Cells #


Sodium Level                                                137           146  H


Potassium Level                                             3.8            3.9


Chloride Level                                               98            104


Carbon Dioxide Level                                         26             27


Anion Gap                                                    13            15  H


Blood Urea Nitrogen                                           8             6  L


Creatinine                                                 0.46           0.51


Est Glomerular        
              
              > 60  
        > 60  



Filtrat Rate
mL/min


Glucose Level                                              228  H          168


Calcium Level                                               9.8           10.1


Total Bilirubin                                             0.7            1.1


Direct Bilirubin                                           0.00           0.00


Indirect Bilirubin                                          0.7            1.1


Aspartate Amino       
              
                     61  H
         54  H



Transf
(AST/SGOT)


Alanine               
              
                     75  H
          61  



Aminotransferase
(AL


T/SGPT)


Alkaline Phosphatase                                         92             84


Total Protein                                              9.0  H          8.1


Albumin                                                    5.0  H          4.7


Globulin                                                  4.00  H        3.40  H


Albumin/Globulin                                           1.25           1.38


Ratio


Lipase                                                       45


Prothrombin Time                                                         11.8  L


Prothrombin Time                                                           0.9


Ratio


INR International     
              
              
                    0.86  



Normalized
Ratio


Activated             
              
              
                    30.6  



Partial
Thromboplast


Time


Hemoglobin A1c                                                            6.7  H


Magnesium Level                                                            2.0


Triglycerides Level                                                         66


Cholesterol Level                                                         219  H


LDL Cholesterol,                                                           138


Calculated


HDL Cholesterol                                                             68


Cholesterol/HDL                                                            3.2


Ratio


Thyroid Stimulating   
              
              
                  0.274  L



Hormone
(TSH)


Test
                 6/20/19
03:47  6/20/19
06:04  6/20/19
09:40  6/20/19
12:28


Bedside Glucose               147            125            175            201


Test
                 6/20/19
13:18  
              
              



Bedside Glucose               218








Subjective


24 Hr Interval Summary


Gastrointestinal:  pain





Exam/Review of Systems


Exam


Vitals





Vital Signs


  Date      Temp  Pulse  Resp  B/P (MAP)   Pulse Ox  O2          O2 Flow    FiO2


Time                                                 Delivery    Rate


   6/20/19           68    16      119/67        98  Room Air


     12:18                           (84)


   6/20/19  98.3


     11:46


   6/20/19                                                             8.0


     11:43








Intake and Output





6/19/19 6/19/19 6/20/19





1515:00


23:00


07:00





IntakeIntake Total


100 ml





BalanceBalance


100 ml











Constitutional:  alert


Respiratory:  clear to auscultation


Cardiovascular:  regular rate and rhythm


Gastrointestinal:  soft; 


   No distended


Musculoskeletal:  nl extremities to inspection





Results


Results 24hrs





Laboratory Tests


Test
                 6/19/19
22:31  6/19/19
22:33  6/19/19
22:36  6/20/19
03:00


POC Beta HCG,         NEGATIVE


Qualitative


Bedside Urine pH                             7.0


(LAB)


Bedside Urine                                2+  H


Protein (LAB)


Bedside Urine                             0.50%  H


Glucose (UA)


Bedside Urine                                3+  H


Ketones (LAB)


Bedside Urine Blood                  Negative


Bedside Urine                        Negative


Nitrite (LAB)


Bedside Urine         
              Negative  
    
              



Leukocyte
Esterase


(L


White Blood Count                                         15.0  H       18.5  #H


Red Blood Count                                           4.18  L        4.15  L


Hemoglobin                                                 13.8           13.6


Hematocrit                                                 41.5           41.2


Mean Corpuscular                                           99.3           99.3


Volume


Mean Corpuscular                                           33.0           32.8


Hemoglobin


Mean Corpuscular      
              
                    33.3  
        33.0  



Hemoglobin
Concent


Red Cell                                                   11.9           11.9


Distribution Width


Platelet Count                                              254            260


Mean Platelet Volume                                       10.0           10.3


Immature                                                  0.400         0.700  H


Granulocytes %


Neutrophils %                                             91.2  H        86.9  H


Lymphocytes %                                              6.4  L         5.6  L


Monocytes %                                                 1.6            6.5


Eosinophils %                                               0.1            0.0


Basophils %                                                 0.3            0.3


Nucleated Red Blood                                         0.0            0.0


Cells %


Immature                                                 0.060  H       0.130  H


Granulocytes #


Neutrophils #                                             13.7  H        16.1  H


Lymphocytes #                                               1.0            1.0


Monocytes #                                                0.2  L         1.2  H


Eosinophils #                                               0.0            0.0


Basophils #                                                 0.1            0.1


Nucleated Red Blood                                         0.0            0.0


Cells #


Sodium Level                                                137           146  H


Potassium Level                                             3.8            3.9


Chloride Level                                               98            104


Carbon Dioxide Level                                         26             27


Anion Gap                                                    13            15  H


Blood Urea Nitrogen                                           8             6  L


Creatinine                                                 0.46           0.51


Est Glomerular        
              
              > 60  
        > 60  



Filtrat Rate
mL/min


Glucose Level                                              228  H          168


Calcium Level                                               9.8           10.1


Total Bilirubin                                             0.7            1.1


Direct Bilirubin                                           0.00           0.00


Indirect Bilirubin                                          0.7            1.1


Aspartate Amino       
              
                     61  H
         54  H



Transf
(AST/SGOT)


Alanine               
              
                     75  H
          61  



Aminotransferase
(AL


T/SGPT)


Alkaline Phosphatase                                         92             84


Total Protein                                              9.0  H          8.1


Albumin                                                    5.0  H          4.7


Globulin                                                  4.00  H        3.40  H


Albumin/Globulin                                           1.25           1.38


Ratio


Lipase                                                       45


Prothrombin Time                                                         11.8  L


Prothrombin Time                                                           0.9


Ratio


INR International     
              
              
                    0.86  



Normalized
Ratio


Activated             
              
              
                    30.6  



Partial
Thromboplast


Time


Hemoglobin A1c                                                            6.7  H


Magnesium Level                                                            2.0


Triglycerides Level                                                         66


Cholesterol Level                                                         219  H


LDL Cholesterol,                                                           138


Calculated


HDL Cholesterol                                                             68


Cholesterol/HDL                                                            3.2


Ratio


Thyroid Stimulating   
              
              
                  0.274  L



Hormone
(TSH)


Test
                 6/20/19
03:47  6/20/19
06:04  6/20/19
09:40  6/20/19
12:28


Bedside Glucose               147            125            175            201


Test
                 6/20/19
13:18  
              
              



Bedside Glucose               218








Medications


Medication





Current Medications


Ondansetron HCl (Zofran Inj) 4 mg BRIDGE ORDER PRN IV NAUSEA/VOMITING;  Start 


6/20/19 at 02:00;  Stop 6/21/19 at 01:59


Acetaminophen (Tylenol Tab) 650 mg ER BRIDGE PRN PO .MILD PAIN 1-3 OR TEMP;  


Start 6/20/19 at 02:00;  Stop 6/21/19 at 01:59


IV Flush (NS 3 ml) 3 ml PER PROTOCOL IV ;  Start 6/20/19 at 02:30


Ondansetron HCl (Zofran Inj) 4 mg Q6H  PRN IV NAUSEA/VOMITING;  Start 6/20/19 at


02:30


Acetaminophen (Tylenol Tab) 650 mg Q6H  PRN PO .PAIN 1-3 OR TEMP;  Start 6/20/19


at 02:30


Morphine Sulfate (morphine) 2 mg Q4H  PRN IV .SEVERE PAIN 7-10;  Start 6/20/19 


at 02:30


Docusate Sodium (Colace) 100 mg Q12H  PRN PO .CONSTIPATION;  Start 6/20/19 at 


02:30


Bisacodyl (Dulcolax) 5 mg DAILY  PRN PO .CONSTIPATION;  Start 6/20/19 at 02:30


Dorzolamide/ Timolol (Cosopt) 1 drop BID BOTH EYES ;  Start 6/20/19 at 02:30


Famotidine (Pepcid) 20 mg QHS PO ;  Start 6/20/19 at 21:00


Latanoprost (Xalatan) 1 drop DAILY BOTH EYES ;  Start 6/20/19 at 09:00


Miscellaneous Information 1 drop DAILY OPHTHALMIC ;  Start 6/20/19 at 09:00;  


Status UNV


Pantoprazole (Protonix Tab) 40 mg DAILY@06 PO ;  Start 6/20/19 at 06:00


Insulin Aspart (Novolog Insulin Pen) NOVOLOG *MILD* ALGORI... Q4 SC  Last 


administered on 6/20/19at 13:20; Admin Dose 2 UNIT;  Start 6/20/19 at 05:00


Ketorolac Tromethamine (Toradol) 30 mg Q6H  PRN IV PAIN LEVEL 1-3 Last 


administered on 6/20/19at 06:06; Admin Dose 30 MG;  Start 6/20/19 at 04:30;  


Stop 6/22/19 at 04:29


Piperacillin Sod/ Tazobactam Sod 100 ml @  200 mls/hr Q6 IVPB  Last administered


on 6/20/19at 05:54; Admin Dose 200 MLS/HR;  Start 6/20/19 at 06:00


Dextrose/Sodium Chloride 1,000 ml @  75 mls/hr [id number] IV  Last administered on 


6/20/19at 05:48; Admin Dose 75 MLS/HR;  Start 6/20/19 at 05:00


Hydromorphone HCl (Dilaudid) 0.2 mg PACU PRN IV MILD PAIN 1-3;  Start 6/20/19 at


10:30;  Stop 6/20/19 at 16:00


Hydromorphone HCl (Dilaudid) 0.4 mg PACU PRN IV MOD PAIN 4-6;  Start 6/20/19 at 


10:30;  Stop 6/20/19 at 16:00


Hydromorphone HCl (Dilaudid) 0.6 mg PACU PRN IV SEVERE PAIN 7-10;  Start 6/20/19


at 10:30;  Stop 6/20/19 at 16:00


Fentanyl (Sublimaze) 25 mcg PACU ORDER PRN IV MILD PAIN 1-3;  Start 6/20/19 at 


10:30;  Stop 6/20/19 at 16:00


Fentanyl (Sublimaze) 50 mcg PACU ORDER PRN IV MOD PAIN 4-6;  Start 6/20/19 at 


10:30;  Stop 6/20/19 at 16:00


Fentanyl (Sublimaze) 75 mcg PACU ORDER PRN IV SEVERE PAIN 7-10;  Start 6/20/19 


at 10:30;  Stop 6/20/19 at 16:00


Ketorolac Tromethamine (Toradol) 30 mg PACU ORDER PRN IV FOR PAIN AFTER IV 


NARCOTIC MED;  Start 6/20/19 at 10:30;  Stop 6/20/19 at 16:00


Ondansetron HCl (Zofran Inj) 4 mg PACU ORDER PRN IV NAUSEA/VOMITING;  Start 


6/20/19 at 10:30;  Stop 6/20/19 at 16:00


Meperidine HCl (Demerol) 25 mg PACU ORDER PRN IV .RIGORS;  Start 6/20/19 at 


10:30;  Stop 6/20/19 at 16:00


Diphenhydramine HCl (Benadryl) 25 mg PACU ORDER PRN IV .PRURITUS;  Start 6/20/19


at 10:30;  Stop 6/20/19 at 16:00


Oxycodone/ Acetaminophen (Percocet (5/ 325)) 1 tab Q4H  PRN PO .MILD PAIN (1-3);


 Start 6/20/19 at 11:30


Oxycodone/ Acetaminophen (Percocet (5/ 325)) 2 tab Q4H  PRN PO .MODERATE PAIN 


(4-6);  Start 6/20/19 at 11:30


Morphine Sulfate (morphine) 2 mg ONCE PRN IV .SEVERE PAIN 7-10;  Start 6/20/19 


at 11:30


Ondansetron HCl (Zofran Inj) 4 mg Q6H  PRN IV NAUSEA/VOMITING;  Start 6/20/19 at


11:30











HARRIS DIXON                  Jun 20, 2019 15:06

## 2019-06-20 NOTE — OPR
Date/Time of Note


Date/Time of Note


DATE: 6/20/19 


TIME: 11:15





Operative Report


Procedure Date:  Jun 20, 2019


Preoperative Diagnosis


Acute appendicitis


Postoperative Diagnosis


Acute appendicitis without localized peritonitis


Operation/Procedure Performed


Laparoscopic appendectomy


Surgeon


Alida Gayle MD


Assistant


None


Anesthesia Type:  general


Anesthesiologist:  JONATHAN BENNETT MD


Estimated Blood Loss:  minimal


Transfusion


   none


Specimen


Appendix


Grafts/Implants


none


Tubes/Drains


None


Complications


none


Pt Condition Post Procedure:  stable


Disposition:  PACU


Indications


Acute appendicitis


Procedure Description


After satisfactory general endotracheal anesthesia was achieved, the abdomen was


prepped and draped in the usual fashion.  The abdomen was insufflated with 


carbon dioxide through an umbilical Veress needle to 15 mmHg pressure.  The 


Veress needle was removed and the umbilical incision extended to 5 mm which a 5 


mm 30 degree lens was placed.  Laparoscopy showed an acutely inflamed 


intraperitoneal appendix without localized peritonitis.  Under direct 


visualization a 5 mm suprapubic trocar was placed.  The camera was placed into 


the suprapubic port.  Under direct visualization a 12 mm left lower quadrant t


rocar was placed.  A window was made in the mesoappendix, through which a 


stapler was placed across the base of the cecum, closed and fired disconnecting 


the appendix from the cecum save for a small bridge of 2 staples.  This bridge 


was divided sharply after two10 mm staples were placed on the cecal side.  A 


second firing of the stapler across the mesoappendix fully freed the appendix 


which was placed intact into an Endo Catch removed via the left lower quadrant 


port.  Hemostasis of the staple lines was completed with electrocautery, and 


irrigant now returned clear.  With the assistance of a geovanna-close device 2-0 


Vicryl sutures were placed at the 12 mm port site.  The abdomen was then 


desufflated and the trochars were removed.  The fascial sutures were tied down. 


The skin punctures were infiltrated with 30 cc of 0.25% Marcaine with 


epinephrine and closed with staples.  Sponge and needle counts were reported as 


correct x2.  Operative blood loss less than 10 cc.  The procedure was tolerated 


well and without incident or complication.











ALIDA GAYLE MD             Jun 20, 2019 11:19

## 2019-06-20 NOTE — CONS
Assessment/Plan


Assessment/Plan


Assessment/Plan (Daily)


Acute appendicitis.


Plan: Laparoscopic appendectomy, possible open.  I have discussed the procedure,


outcomes, expectations, alternatives and risks in detail with the patient who 


has an excellent understanding of the nature of her situation and agrees to the 


proposed plan of therapy as outlined.





Consultation Date/Type/Reason


Admit Date/Time





Date of Consultation:  Jun 20, 2019


Type of Consult


General surgery


Reason for Consultation


Acute appendicitis


Date/Time of Note


DATE: 6/20/19 


TIME: 08:39





Hx of Present Illness


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  She worked yesterday, however after work started


to develop nonspecific abdominal pain which intensified in severity than 


localized to the right lower quadrant.  In the emergency room she was noted to 


have a tender right lower quadrant, and elevated white blood cell count of 


15,000, and a CT compatible with acute appendicitis.  She has had no fevers or 


chills.  Her comorbidities are diabetes


Review of systems:


HEENT: Glaucoma


Pulmonary: No history of asthma, pneumonia or shortness of breath


Cardiac: No history of chest pain, MI or arrhythmia


GI: As in the HPI


: Tubal ligation





Past Medical History


Medical History:  diabetes


Home Meds


Active Scripts


Famotidine* (Pepcid*) 20 Mg Tablet, 20 MG PO QHS for 10 Days, TAB


   Prov:SHANIQUE HERNADEZ PA-C         9/2/18


Omeprazole* (Omeprazole*) 20 Mg Capsule.dr, 20 MG PO DAILY, #14


   Prov:SHANIQUE HERNADEZ PA-C         9/2/18


Metformin Hcl* (Metformin Hcl* ER) 1,000 Mg Tab.er.24, 1000 MG PO DAILY, #30 TAB


   Prov:SHANIQUE HERNADEZ PA-C         9/2/18


Metronidazole* (Metrogel* Vaginal) 0.75% -70 Gram Gel.w.appl, 1 APPFUL VAG HS 


for 7 Days, TUB


   Prov:SHANIQUE HERNADEZ PA-C         9/2/18


Acetaminophen* (Tylophen*) 500 Mg Capsule, 1 CAP PO Q4 PRN for PAIN AND OR 


ELEVATED TEMP, #20 CAP


   Prov:CADY ESTRELLA         3/17/18


Ondansetron Hcl* (Zofran*) 4 Mg Tablet, 4 MG PO Q8H PRN for NAUSEA AND/OR 


VOMITING, #30 TAB


   Prov:CORRIERICKYCADY         3/17/18


Reported Medications


Dorzolamide/Timolol* (Dorzolamide/Timolol*) 10 Ml Drops, 1 DROP BOTH EYES BID, 


#1 EA


   6/20/19


[Rhopressa] 0.02% DROPS No Conflict Check, 1 DROP OPHTHALMIC DAILY


   APPLY 1 DROP INTO BOTH EYES AT BEDTIME


   6/20/19


Latanoprost (Latanoprost) 2.5 Ml Drops, 1 DROP OPHTHALMIC DAILY for 30 Days


   6/20/19


Discontinued Reported Medications


Dorzolamide/Timolol* (Dorzolamide/Timolol*) 10 Ml Drops


   6/20/19


Discontinued Scripts


Ondansetron (Ondansetron Odt) 4 Mg Tab.rapdis, 4 MG PO Q6H PRN for NAUSEA AND/OR


 VOMITING, #10 TAB


   Prov:SHANIQUE HERNADEZ PA-C         9/2/18


Hydrocodone/Acetaminophen (Norco 5-325 Tablet) 1 Each Tablet, 1 TAB PO Q6H PRN 


for PAIN, #20 TAB


   Prov:CORRIERICKYCADY         3/17/18


Cephalexin* (Keflex*) 500 Mg Capsule, 500 MG PO QID for 5 Days, CAP


   Prov:RURIVERACADY         3/17/18


Famotidine* (Pepcid*) 20 Mg Tablet, 20 MG PO DAILY for 30 Days, TAB


   Prov:CORRIERICKYCADY         3/17/18


Metformin* (Glucophage*) 500 Mg Tab, 500 MG PO BID, #60 TAB


   Prov:CADY ESTRELLA         3/17/18


Medications





Current Medications


Ondansetron HCl (Zofran Inj) 4 mg BRIDGE ORDER PRN IV NAUSEA/VOMITING;  Start 


6/20/19 at 02:00;  Stop 6/21/19 at 01:59


Acetaminophen (Tylenol Tab) 650 mg ER BRIDGE PRN PO .MILD PAIN 1-3 OR TEMP;  


Start 6/20/19 at 02:00;  Stop 6/21/19 at 01:59


IV Flush (NS 3 ml) 3 ml PER PROTOCOL IV ;  Start 6/20/19 at 02:30


Ondansetron HCl (Zofran Inj) 4 mg Q6H  PRN IV NAUSEA/VOMITING;  Start 6/20/19 at


 02:30


Acetaminophen (Tylenol Tab) 650 mg Q6H  PRN PO .PAIN 1-3 OR TEMP;  Start 6/20/19


 at 02:30


Morphine Sulfate (morphine) 2 mg Q4H  PRN IV .SEVERE PAIN 7-10;  Start 6/20/19 


at 02:30


Docusate Sodium (Colace) 100 mg Q12H  PRN PO .CONSTIPATION;  Start 6/20/19 at 


02:30


Bisacodyl (Dulcolax) 5 mg DAILY  PRN PO .CONSTIPATION;  Start 6/20/19 at 02:30


Dorzolamide/ Timolol (Cosopt) 1 drop BID BOTH EYES ;  Start 6/20/19 at 02:30


Famotidine (Pepcid) 20 mg QHS PO ;  Start 6/20/19 at 21:00


Latanoprost (Xalatan) 1 drop DAILY BOTH EYES ;  Start 6/20/19 at 09:00


Miscellaneous Information 1 drop DAILY OPHTHALMIC ;  Start 6/20/19 at 09:00;  S


terrie UNV


Pantoprazole (Protonix Tab) 40 mg DAILY@06 PO ;  Start 6/20/19 at 06:00


Insulin Aspart (Novolog Insulin Pen) NOVOLOG *MILD* ALGORI... Q4 SC ;  Start 


6/20/19 at 05:00


Ketorolac Tromethamine (Toradol) 30 mg Q6H  PRN IV PAIN LEVEL 1-3 Last 


administered on 6/20/19at 06:06; Admin Dose 30 MG;  Start 6/20/19 at 04:30;  


Stop 6/22/19 at 04:29


Piperacillin Sod/ Tazobactam Sod 100 ml @  200 mls/hr Q6 IVPB  Last administered


 on 6/20/19at 05:54; Admin Dose 200 MLS/HR;  Start 6/20/19 at 06:00


Dextrose/Sodium Chloride 1,000 ml @  75 mls/hr [id number] IV  Last administered on 


6/20/19at 05:48; Admin Dose 75 MLS/HR;  Start 6/20/19 at 05:00


Allergies:  


Coded Allergies:  


     No Known Allergy (Unverified , 6/20/19)





Past Surgical History


Past Surgical Hx:  other (Tubal ligation)





Family History


Significant Family History:  no pertinent family hx





Social History


Alcohol Use:  none


Smoking Status:  Never smoker


Drug Use:  none





Exam/Review of Systems


Exam


Vitals





Vital Signs


  Date      Temp  Pulse  Resp  B/P (MAP)   Pulse Ox  O2          O2 Flow    FiO2


Time                                                 Delivery    Rate


   6/20/19  98.4     75    16      139/71        98  Room Air


     02:45                           (93)








Intake and Output





6/19/19 6/19/19 6/20/19





1515:00


23:00


07:00





IntakeIntake Total


100 ml





BalanceBalance


100 ml











Constitutional:  alert, oriented


Psych:  no complaints


Head:  normocephalic


Eyes:  nl conjunctiva


ENMT:  nl external ears & nose


Neck:  supple


Respiratory:  clear to auscultation


Gastrointestinal:  tender (In the right lower quadrant with guarding but no 


rebound)


Musculoskeletal:  nl extremities to inspection


Extremities:  normal pulses


Neurological:  CNS II-XII intact





Results


Result Diagram:  


6/20/19 0300                                                                    


            6/20/19 0300





Results 24hrs





Laboratory Tests


Test
                 6/19/19
22:31  6/19/19
22:33  6/19/19
22:36  6/20/19
03:00


POC Beta HCG,         NEGATIVE


Qualitative


Bedside Urine pH                             7.0


(LAB)


Bedside Urine                                2+  H


Protein (LAB)


Bedside Urine                             0.50%  H


Glucose (UA)


Bedside Urine                                3+  H


Ketones (LAB)


Bedside Urine Blood                  Negative


Bedside Urine                        Negative


Nitrite (LAB)


Bedside Urine         
              Negative  
    
              



Leukocyte
Esterase


(L


White Blood Count                                         15.0  H       18.5  #H


Red Blood Count                                           4.18  L        4.15  L


Hemoglobin                                                 13.8           13.6


Hematocrit                                                 41.5           41.2


Mean Corpuscular                                           99.3           99.3


Volume


Mean Corpuscular                                           33.0           32.8


Hemoglobin


Mean Corpuscular      
              
                    33.3  
        33.0  



Hemoglobin
Concent


Red Cell                                                   11.9           11.9


Distribution Width


Platelet Count                                              254            260


Mean Platelet Volume                                       10.0           10.3


Immature                                                  0.400         0.700  H


Granulocytes %


Neutrophils %                                             91.2  H        86.9  H


Lymphocytes %                                              6.4  L         5.6  L


Monocytes %                                                 1.6            6.5


Eosinophils %                                               0.1            0.0


Basophils %                                                 0.3            0.3


Nucleated Red Blood                                         0.0            0.0


Cells %


Immature                                                 0.060  H       0.130  H


Granulocytes #


Neutrophils #                                             13.7  H        16.1  H


Lymphocytes #                                               1.0            1.0


Monocytes #                                                0.2  L         1.2  H


Eosinophils #                                               0.0            0.0


Basophils #                                                 0.1            0.1


Nucleated Red Blood                                         0.0            0.0


Cells #


Sodium Level                                                137           146  H


Potassium Level                                             3.8            3.9


Chloride Level                                               98            104


Carbon Dioxide Level                                         26             27


Anion Gap                                                    13            15  H


Blood Urea Nitrogen                                           8             6  L


Creatinine                                                 0.46           0.51


Est Glomerular        
              
              > 60  
        > 60  



Filtrat Rate
mL/min


Glucose Level                                              228  H          168


Calcium Level                                               9.8           10.1


Total Bilirubin                                             0.7            1.1


Direct Bilirubin                                           0.00           0.00


Indirect Bilirubin                                          0.7            1.1


Aspartate Amino       
              
                     61  H
         54  H



Transf
(AST/SGOT)


Alanine               
              
                     75  H
          61  



Aminotransferase
(AL


T/SGPT)


Alkaline Phosphatase                                         92             84


Total Protein                                              9.0  H          8.1


Albumin                                                    5.0  H          4.7


Globulin                                                  4.00  H        3.40  H


Albumin/Globulin                                           1.25           1.38


Ratio


Lipase                                                       45


Prothrombin Time                                                         11.8  L


Prothrombin Time                                                           0.9


Ratio


INR International     
              
              
                    0.86  



Normalized
Ratio


Activated             
              
              
                    30.6  



Partial
Thromboplast


Time


Hemoglobin A1c                                                            6.7  H


Magnesium Level                                                            2.0


Triglycerides Level                                                         66


Cholesterol Level                                                         219  H


LDL Cholesterol,                                                           138


Calculated


HDL Cholesterol                                                             68


Cholesterol/HDL                                                            3.2


Ratio


Thyroid Stimulating   
              
              
                  0.274  L



Hormone
(TSH)


Test
                 6/20/19
03:47  6/20/19
06:04  
              



Bedside Glucose               147            125








Medications


Medication





Current Medications


Ondansetron HCl (Zofran Inj) 4 mg BRIDGE ORDER PRN IV NAUSEA/VOMITING;  Start 6/ 20/19 at 02:00;  Stop 6/21/19 at 01:59


Acetaminophen (Tylenol Tab) 650 mg ER BRIDGE PRN PO .MILD PAIN 1-3 OR TEMP;  


Start 6/20/19 at 02:00;  Stop 6/21/19 at 01:59


IV Flush (NS 3 ml) 3 ml PER PROTOCOL IV ;  Start 6/20/19 at 02:30


Ondansetron HCl (Zofran Inj) 4 mg Q6H  PRN IV NAUSEA/VOMITING;  Start 6/20/19 at


 02:30


Acetaminophen (Tylenol Tab) 650 mg Q6H  PRN PO .PAIN 1-3 OR TEMP;  Start 6/20/19


 at 02:30


Morphine Sulfate (morphine) 2 mg Q4H  PRN IV .SEVERE PAIN 7-10;  Start 6/20/19 


at 02:30


Docusate Sodium (Colace) 100 mg Q12H  PRN PO .CONSTIPATION;  Start 6/20/19 at 


02:30


Bisacodyl (Dulcolax) 5 mg DAILY  PRN PO .CONSTIPATION;  Start 6/20/19 at 02:30


Dorzolamide/ Timolol (Cosopt) 1 drop BID BOTH EYES ;  Start 6/20/19 at 02:30


Famotidine (Pepcid) 20 mg QHS PO ;  Start 6/20/19 at 21:00


Latanoprost (Xalatan) 1 drop DAILY BOTH EYES ;  Start 6/20/19 at 09:00


Miscellaneous Information 1 drop DAILY OPHTHALMIC ;  Start 6/20/19 at 09:00;  


Status UNV


Pantoprazole (Protonix Tab) 40 mg DAILY@06 PO ;  Start 6/20/19 at 06:00


Insulin Aspart (Novolog Insulin Pen) NOVOLOG *MILD* ALGORI... Q4 SC ;  Start 


6/20/19 at 05:00


Ketorolac Tromethamine (Toradol) 30 mg Q6H  PRN IV PAIN LEVEL 1-3 Last 


administered on 6/20/19at 06:06; Admin Dose 30 MG;  Start 6/20/19 at 04:30;  


Stop 6/22/19 at 04:29


Piperacillin Sod/ Tazobactam Sod 100 ml @  200 mls/hr Q6 IVPB  Last administered


 on 6/20/19at 05:54; Admin Dose 200 MLS/HR;  Start 6/20/19 at 06:00


Dextrose/Sodium Chloride 1,000 ml @  75 mls/hr [id number] IV  Last administered on 


6/20/19at 05:48; Admin Dose 75 MLS/HR;  Start 6/20/19 at 05:00











ALIDA GAYLE MD             Jun 20, 2019 08:42

## 2019-06-21 VITALS — RESPIRATION RATE: 18 BRPM | DIASTOLIC BLOOD PRESSURE: 75 MMHG | HEART RATE: 90 BPM | SYSTOLIC BLOOD PRESSURE: 124 MMHG

## 2019-06-21 LAB
ADD MAN DIFF?: NO
ALANINE AMINOTRANSFERASE: 60 IU/L (ref 13–69)
ALBUMIN/GLOBULIN RATIO: 1.28
ALBUMIN: 3.6 G/DL (ref 3.3–4.9)
ALKALINE PHOSPHATASE: 65 IU/L (ref 42–121)
ANION GAP: 10 (ref 5–13)
ASPARTATE AMINO TRANSFERASE: 49 IU/L (ref 15–46)
BASOPHIL #: 0 10^3/UL (ref 0–0.1)
BASOPHILS %: 0.2 % (ref 0–2)
BILIRUBIN,DIRECT: 0 MG/DL (ref 0–0.2)
BILIRUBIN,TOTAL: 0.6 MG/DL (ref 0.2–1.3)
BLOOD UREA NITROGEN: 9 MG/DL (ref 7–20)
CALCIUM: 8.8 MG/DL (ref 8.4–10.2)
CARBON DIOXIDE: 25 MMOL/L (ref 21–31)
CHLORIDE: 110 MMOL/L (ref 97–110)
CREATININE: 0.57 MG/DL (ref 0.44–1)
EOSINOPHILS #: 0 10^3/UL (ref 0–0.5)
EOSINOPHILS %: 0.1 % (ref 0–7)
GLOBULIN: 2.8 G/DL (ref 1.3–3.2)
GLUCOSE: 113 MG/DL (ref 70–220)
HEMATOCRIT: 33.4 % (ref 37–47)
HEMOGLOBIN: 11.2 G/DL (ref 12–16)
IMMATURE GRANS #M: 0.03 10^3/UL (ref 0–0.03)
IMMATURE GRANS % (M): 0.3 % (ref 0–0.43)
LYMPHOCYTES #: 1.9 10^3/UL (ref 0.8–2.9)
LYMPHOCYTES %: 17.8 % (ref 15–51)
MEAN CORPUSCULAR HEMOGLOBIN: 33.2 PG (ref 29–33)
MEAN CORPUSCULAR HGB CONC: 33.5 G/DL (ref 32–37)
MEAN CORPUSCULAR VOLUME: 99.1 FL (ref 82–101)
MEAN PLATELET VOLUME: 10.7 FL (ref 7.4–10.4)
MONOCYTE #: 0.8 10^3/UL (ref 0.3–0.9)
MONOCYTES %: 7 % (ref 0–11)
NEUTROPHIL #: 8.1 10^3/UL (ref 1.6–7.5)
NEUTROPHILS %: 74.6 % (ref 39–77)
NUCLEATED RED BLOOD CELLS #: 0 10^3/UL (ref 0–0)
NUCLEATED RED BLOOD CELLS%: 0 /100WBC (ref 0–0)
PLATELET COUNT: 221 10^3/UL (ref 140–415)
POTASSIUM: 3.4 MMOL/L (ref 3.5–5.1)
RED BLOOD COUNT: 3.37 10^6/UL (ref 4.2–5.4)
RED CELL DISTRIBUTION WIDTH: 12 % (ref 11.5–14.5)
SODIUM: 145 MMOL/L (ref 135–144)
TOTAL PROTEIN: 6.4 G/DL (ref 6.1–8.1)
WHITE BLOOD COUNT: 10.8 10^3/UL (ref 4.8–10.8)

## 2019-06-21 RX ADMIN — PIPERACILLIN SODIUM AND TAZOBACTAM SODIUM SCH MLS/HR: 3; .375 INJECTION, POWDER, LYOPHILIZED, FOR SOLUTION INTRAVENOUS at 06:06

## 2019-06-21 RX ADMIN — POTASSIUM CHLORIDE 1 MEQ: 1500 TABLET, EXTENDED RELEASE ORAL at 07:56

## 2019-06-21 RX ADMIN — KETOROLAC TROMETHAMINE PRN MG: 15 INJECTION, SOLUTION INTRAMUSCULAR; INTRAVENOUS at 00:42

## 2019-06-21 RX ADMIN — PANTOPRAZOLE SODIUM SCH MG: 40 TABLET, DELAYED RELEASE ORAL at 06:10

## 2019-06-21 RX ADMIN — DORZOLAMIDE HYDROCHLORIDE AND TIMOLOL MALEATE 1 DROP: 20; 5 SOLUTION/ DROPS OPHTHALMIC at 08:08

## 2019-06-21 RX ADMIN — LATANOPROST SCH DROP: 50 SOLUTION OPHTHALMIC at 08:08

## 2019-06-21 RX ADMIN — KETOROLAC TROMETHAMINE PRN MG: 15 INJECTION, SOLUTION INTRAMUSCULAR; INTRAVENOUS at 06:18

## 2019-06-21 RX ADMIN — PIPERACILLIN SODIUM AND TAZOBACTAM SODIUM SCH MLS/HR: 3; .375 INJECTION, POWDER, LYOPHILIZED, FOR SOLUTION INTRAVENOUS at 00:33

## 2019-06-21 RX ADMIN — KETOROLAC TROMETHAMINE 1 MG: 15 INJECTION, SOLUTION INTRAMUSCULAR; INTRAVENOUS at 00:42

## 2019-06-21 RX ADMIN — PANTOPRAZOLE SODIUM 1 MG: 40 TABLET, DELAYED RELEASE ORAL at 06:10

## 2019-06-21 RX ADMIN — PIPERACILLIN SODIUM AND TAZOBACTAM SODIUM 1 MLS/HR: 3; .375 INJECTION, POWDER, LYOPHILIZED, FOR SOLUTION INTRAVENOUS at 06:06

## 2019-06-21 RX ADMIN — PIPERACILLIN SODIUM AND TAZOBACTAM SODIUM 1 MLS/HR: 3; .375 INJECTION, POWDER, LYOPHILIZED, FOR SOLUTION INTRAVENOUS at 00:33

## 2019-06-21 RX ADMIN — LATANOPROST 1 DROP: 50 SOLUTION OPHTHALMIC at 08:08

## 2019-06-21 RX ADMIN — INSULIN ASPART 1 UNIT: 100 INJECTION, SOLUTION INTRAVENOUS; SUBCUTANEOUS at 07:50

## 2019-06-21 RX ADMIN — KETOROLAC TROMETHAMINE 1 MG: 15 INJECTION, SOLUTION INTRAMUSCULAR; INTRAVENOUS at 06:18

## 2019-06-21 NOTE — PDOCDIS
Discharge Instructions


CONDITION


                 [id number]
Patient Condition:  [id number]
Good








HOME CARE INSTRUCTIONS:


                  [id number]
Special Diet:  [id number]
LOW CARB








ACTIVITY:


     [id number]
Activity Restrictions:  [id number]
Slowly Increase Activity


                                        Avoid heavy lifting (X 4 WEEKS)








FOLLOW UP/APPOINTMENTS


Follow-up Plan


FOLLOW UP WITH YOUR PCP AND DR GAYLE OF SURGERY IN 1-2 WEEKS











HARRIS DIXON                  Jun 21, 2019 07:37

## 2019-06-21 NOTE — RADRPT
Vent Rate: 71 bpm

RR Interval: 844 msec

TX Interval: 130 msec

QRS Duration: 89 msec

QT Interval: 373 msec

QTC Interval: 406 msec

P-R-T Axis: 69 - 1 - 20 degrees

 

Sinus rhythm...normal P axis, V-rate  50- 99

 

Electronically Signed By: Jeremias Corbin

## 2019-06-21 NOTE — DS
Date/Time of Note


Date/Time of Note


DATE: 6/21/19 


TIME: 15:44





Discharge Summary


Admission/Discharge Info


Admit Date/Time


Jun 20, 2019 at 01:47


Discharge Date/Time


Jun 21, 2019 at 09:20


Discharge Diagnosis


1.  Acute appendicitis


Status post lap scopic appendectomy postop day 1


DC with pain meds


Status post Zosyn IV





2.  Diabetes


Continue home regimen





3.  Transaminitis likely reactive-resolving





4. Glaucoma: Continue home eyedrops


Patient Condition:  Good


Hospital Course


Patient is a 49-year-old female history of diabetes and glaucoma who presents 


with abdominal pain found to have acute appendicitis.  Patient underwent 


laparoscopic appendectomy with no reported complications, patient was stable for


DC, on the day of discharge patient vitals, labs and physical exam are stable.


Home Meds


Active Scripts


Famotidine* (Pepcid*) 20 Mg Tablet, 20 MG PO QHS for 10 Days, TAB


   Prov:SHANIQUE HERNADEZ PA-C         9/2/18


Omeprazole* (Omeprazole*) 20 Mg Capsule.dr, 20 MG PO DAILY, #14


   Prov:SHANIQUE HERNADEZ PA-C         9/2/18


Metformin Hcl* (Metformin Hcl* ER) 1,000 Mg Tab.er.24, 1000 MG PO DAILY, #30 TAB


   Prov:SHANIQUE HERNADEZ PA-C         9/2/18


Metronidazole* (Metrogel* Vaginal) 0.75% -70 Gram Gel.w.appl, 1 APPFUL VAG HS 


for 7 Days, TUB


   Prov:SHANIQUE HERNADEZ PA-C         9/2/18


Acetaminophen* (Tylophen*) 500 Mg Capsule, 1 CAP PO Q4 PRN for PAIN AND OR 


ELEVATED TEMP, #20 CAP


   Prov:CADY ESTRELLA         3/17/18


Ondansetron Hcl* (Zofran*) 4 Mg Tablet, 4 MG PO Q8H PRN for NAUSEA AND/OR 


VOMITING, #30 TAB


   Prov:CADY ESTRELLA F         3/17/18


Reported Medications


Dorzolamide/Timolol* (Dorzolamide/Timolol*) 10 Ml Drops, 1 DROP BOTH EYES BID, 


#1 EA


   6/20/19


[Rhopressa] 0.02% DROPS No Conflict Check, 1 DROP OPHTHALMIC DAILY


   APPLY 1 DROP INTO BOTH EYES AT BEDTIME


   6/20/19


Latanoprost (Latanoprost) 2.5 Ml Drops, 1 DROP OPHTHALMIC DAILY for 30 Days


   6/20/19


Discontinued Reported Medications


Dorzolamide/Timolol* (Dorzolamide/Timolol*) 10 Ml Drops


   6/20/19


Discontinued Scripts


Ondansetron (Ondansetron Odt) 4 Mg Tab.rapdis, 4 MG PO Q6H PRN for NAUSEA AND/OR


 VOMITING, #10 TAB


   Prov:SHANIQUE HERNADEZ PA-C         9/2/18


Hydrocodone/Acetaminophen (Norco 5-325 Tablet) 1 Each Tablet, 1 TAB PO Q6H PRN 


for PAIN, #20 TAB


   Prov:CADY ESTRELLA F         3/17/18


Cephalexin* (Keflex*) 500 Mg Capsule, 500 MG PO QID for 5 Days, CAP


   Prov:PASILASUNITA STAFFORDAR F         3/17/18


Famotidine* (Pepcid*) 20 Mg Tablet, 20 MG PO DAILY for 30 Days, TAB


   Prov:PASILASUNITA STAFFORDAR F         3/17/18


Metformin* (Glucophage*) 500 Mg Tab, 500 MG PO BID, #60 TAB


   Prov:SUNITA ESTRELLAAR F         3/17/18


Follow-up Plan


FOLLOW UP WITH YOUR PCP AND DR GAYLE OF SURGERY IN 1-2 WEEKS


Primary Care Provider


Not On Staff Doctor


Time spent on discharge:   > 30 minutes











HARRIS DIXON                  Jun 21, 2019 15:48

## 2019-06-21 NOTE — PAC
Date/Time of Note


Date/Time of Note


DATE: 6/21/19 


TIME: 08:30





Post-Anesthesia Notes


Post-Anesthesia Note


Last documented vital signs





Vital Signs


  Date      Temp  Pulse  Resp  B/P (MAP)   Pulse Ox  O2          O2 Flow    FiO2


Time                                                 Delivery    Rate


   6/21/19  98.2     90    18      124/75        94  Room Air


     08:00                           (91)


   6/20/19                                                             8.0


     11:43





Activity:  WNL


Respiratory function:  WNL


Cardiovascular function:  WNL


Mental status:  Baseline


Pain reasonably controlled:  Yes


Hydration appropriate:  Yes


Nausea/Vomiting absent:  No











JONATHAN BENNETT MD            Jun 21, 2019 08:30